# Patient Record
Sex: FEMALE | Race: BLACK OR AFRICAN AMERICAN | Employment: FULL TIME | URBAN - METROPOLITAN AREA
[De-identification: names, ages, dates, MRNs, and addresses within clinical notes are randomized per-mention and may not be internally consistent; named-entity substitution may affect disease eponyms.]

---

## 2018-03-27 ENCOUNTER — DOCUMENTATION ONLY (OUTPATIENT)
Dept: SURGERY | Age: 53
End: 2018-03-27

## 2018-03-27 NOTE — PROGRESS NOTES
Patient confirm appointment. Patient dropped off cd/reports from [facility]. Cd/reports will be at front [email or web address].

## 2018-03-28 ENCOUNTER — DOCUMENTATION ONLY (OUTPATIENT)
Dept: SURGERY | Age: 53
End: 2018-03-28

## 2018-03-28 ENCOUNTER — OFFICE VISIT (OUTPATIENT)
Dept: SURGERY | Age: 53
End: 2018-03-28

## 2018-03-28 VITALS
SYSTOLIC BLOOD PRESSURE: 131 MMHG | HEIGHT: 66 IN | HEART RATE: 90 BPM | DIASTOLIC BLOOD PRESSURE: 66 MMHG | BODY MASS INDEX: 46.61 KG/M2 | WEIGHT: 290 LBS

## 2018-03-28 DIAGNOSIS — N60.01 BREAST CYST, RIGHT: Primary | ICD-10-CM

## 2018-03-28 DIAGNOSIS — D05.12 DUCTAL CARCINOMA IN SITU (DCIS) OF LEFT BREAST: ICD-10-CM

## 2018-03-28 PROBLEM — E66.01 OBESITY, MORBID (HCC): Status: ACTIVE | Noted: 2018-03-28

## 2018-03-28 RX ORDER — ZOLPIDEM TARTRATE 5 MG/1
TABLET ORAL
Refills: 0 | COMMUNITY
Start: 2018-01-17

## 2018-03-28 RX ORDER — RIZATRIPTAN BENZOATE 10 MG/1
TABLET ORAL
Refills: 0 | COMMUNITY
Start: 2018-03-13

## 2018-03-28 NOTE — PROGRESS NOTES
HISTORY OF PRESENT ILLNESS  Francisco Montano is a 47 y.o. female. HPI   NEW patient here today referred for consultation at the request of Dr. Liam Bonner for RIGHT breast lump. Patient palpated lump in RIGHT breast at 7 o'clock one week ago. She has tenderness to RIGHT breast. Denies any nipple discharge/retraction or skin changes. History of breast cancer-  2008- LEFT breast cancer. LEFT mastectomy with bilateral reconstruction. No chemotherapy or radiation. 2/2013- 3ml RIGHT breast cyst aspiration. No FH of breast or ovarian cancer. Recent imaging-  RIGHT screening mammogram at [address] [place] [address] on 11/11/17- BIRADS 2    Review of Systems   Constitutional: Negative. Eyes: Negative. Respiratory: Negative. Cardiovascular: Negative. Gastrointestinal: Positive for heartburn. Genitourinary: Negative. Musculoskeletal: Positive for back pain. Skin: Negative. Neurological: Positive for headaches. Endo/Heme/Allergies: Negative. Psychiatric/Behavioral: Negative.         Physical Exam    ASSESSMENT and PLAN  {ASSESSMENT/PLAN:41434}

## 2018-03-28 NOTE — PATIENT INSTRUCTIONS
Breast Self-Exam: Care Instructions  Your Care Instructions    A breast self-exam is when you check your breasts for lumps or changes. This regular exam helps you learn how your breasts normally look and feel. Most breast problems or changes are not because of cancer. Breast self-exam is not a substitute for a mammogram. Having regular breast exams by your doctor and regular mammograms improve your chances of finding any problems with your breasts. Some women set a time each month to do a step-by-step breast self-exam. Other women like a less formal system. They might look at their breasts as they brush their teeth, or feel their breasts once in a while in the shower. If you notice a change in your breast, tell your doctor. Follow-up care is a key part of your treatment and safety. Be sure to make and go to all appointments, and call your doctor if you are having problems. It's also a good idea to know your test results and keep a list of the medicines you take. How do you do a breast self-exam?  · The best time to examine your breasts is usually one week after your menstrual period begins. Your breasts should not be tender then. If you do not have periods, you might do your exam on a day of the month that is easy to remember. · To examine your breasts:  ¨ Remove all your clothes above the waist and lie down. When you are lying down, your breast tissue spreads evenly over your chest wall, which makes it easier to feel all your breast tissue. ¨ Use the pads-not the fingertips-of the 3 middle fingers of your left hand to check your right breast. Move your fingers slowly in small coin-sized circles that overlap. ¨ Use three levels of pressure to feel of all your breast tissue. Use light pressure to feel the tissue close to the skin surface. Use medium pressure to feel a little deeper. Use firm pressure to feel your tissue close to your breastbone and ribs.  Use each pressure level to feel your breast tissue before moving on to the next spot. ¨ Check your entire breast, moving up and down as if following a strip from the collarbone to the bra line, and from the armpit to the ribs. Repeat until you have covered the entire breast.  ¨ Repeat this procedure for your left breast, using the pads of the 3 middle fingers of your right hand. · To examine your breasts while in the shower:  ¨ Place one arm over your head and lightly soap your breast on that side. ¨ Using the pads of your fingers, gently move your hand over your breast (in the strip pattern described above), feeling carefully for any lumps or changes. ¨ Repeat for the other breast.  · Have your doctor inspect anything you notice to see if you need further testing. Where can you learn more? Go to http://alicia-navneet.info/. Enter P148 in the search box to learn more about \"Breast Self-Exam: Care Instructions. \"  Current as of: May 12, 2017  Content Version: 11.4  © 5093-1628 [employer]wise[employer]. Care instructions adapted under license by [employer] (which disclaims liability or warranty for this information). If you have questions about a medical condition or this instruction, always ask your healthcare professional. [place] [address] any warranty or liability for your use of this information.

## 2018-03-28 NOTE — MR AVS SNAPSHOT
[address] [facility] G11 [address] [place] [address] 
[phone] Patient: Joesph Aschoff MRN: [id number] :1965 Visit Information Date & Time Provider Department Dept. Phone Encounter #  
 3/28/2018  4:10 PM Aminata Aguilar MD [address] at [facility] [phone] Upcoming Health Maintenance Date Due Hepatitis C Screening 1965 DTaP/Tdap/Td series (1 - Tdap) 1985 PAP AKA CERVICAL CYTOLOGY 1985 FOBT Q 1 YEAR AGE 50-75 2014 Influenza Age 5 to Adult 2017 BREAST CANCER SCRN MAMMOGRAM 2019 Allergies as of 3/28/2018  Review Complete On: 3/28/2018 By: Citlalli Castro RN Severity Noted Reaction Type Reactions Aspirin  2018    Unknown (comments) Ibuprofen  2013    Swelling Current Immunizations  Never Reviewed No immunizations on file. Not reviewed this visit Vitals BP Pulse OB Status Smoking Status 131/66 90 Having regular periods Never Smoker Your Updated Medication List  
  
   
This list is accurate as of 3/28/18  3:05 PM.  Always use your most recent med list.  
  
  
  
  
 rizatriptan 10 mg tablet Commonly known as:  MAXALT  
  
 zolpidem 5 mg tablet Commonly known as:  AMBIEN  
  
 ZyrTEC 10 mg Cap Generic drug:  Cetirizine Take  by mouth. Patient Instructions Breast Self-Exam: Care Instructions Your Care Instructions A breast self-exam is when you check your breasts for lumps or changes. This regular exam helps you learn how your breasts normally look and feel. Most breast problems or changes are not because of cancer. Breast self-exam is not a substitute for a mammogram. Having regular breast exams by your doctor and regular mammograms improve your chances of finding any problems with your breasts. Some women set a time each month to do a step-by-step breast self-exam. Other women like a less formal system. They might look at their breasts as they brush their teeth, or feel their breasts once in a while in the shower. If you notice a change in your breast, tell your doctor. Follow-up care is a key part of your treatment and safety. Be sure to make and go to all appointments, and call your doctor if you are having problems. It's also a good idea to know your test results and keep a list of the medicines you take. How do you do a breast self-exam? 
· The best time to examine your breasts is usually one week after your menstrual period begins. Your breasts should not be tender then. If you do not have periods, you might do your exam on a day of the month that is easy to remember. · To examine your breasts: ¨ Remove all your clothes above the waist and lie down. When you are lying down, your breast tissue spreads evenly over your chest wall, which makes it easier to feel all your breast tissue. ¨ Use the pads-not the fingertips-of the 3 middle fingers of your left hand to check your right breast. Move your fingers slowly in small coin-sized circles that overlap. ¨ Use three levels of pressure to feel of all your breast tissue. Use light pressure to feel the tissue close to the skin surface. Use medium pressure to feel a little deeper. Use firm pressure to feel your tissue close to your breastbone and ribs. Use each pressure level to feel your breast tissue before moving on to the next spot. ¨ Check your entire breast, moving up and down as if following a strip from the collarbone to the bra line, and from the armpit to the ribs. Repeat until you have covered the entire breast. 
¨ Repeat this procedure for your left breast, using the pads of the 3 middle fingers of your right hand. · To examine your breasts while in the shower: 
¨ Place one arm over your head and lightly soap your breast on that side. ¨ Using the pads of your fingers, gently move your hand over your breast (in the strip pattern described above), feeling carefully for any lumps or changes. ¨ Repeat for the other breast. 
· Have your doctor inspect anything you notice to see if you need further testing. Where can you learn more? Go to http://alicia-navneet.info/. Enter P148 in the search box to learn more about \"Breast Self-Exam: Care Instructions. \" Current as of: May 12, 2017 Content Version: 11.4 © 6916-5346 [employer]. Care instructions adapted under license by [email or web address] (which disclaims liability or warranty for this information). If you have questions about a medical condition or this instruction, always ask your healthcare professional. [address] any warranty or liability for your use of this information. Introducing [facility]! [employer] introduces [employer] patient portal. Now you can access parts of your medical record, email your doctor's office, and request medication refills online. 1. In your internet browser, go to [email or web address] 2. Click on the First Time User? Click Here link in the Sign In box. You will see the New Member Sign Up page. 3. Enter your FastHealth Access Code exactly as it appears below. You will not need to use this code after youve completed the sign-up process. If you do not sign up before the expiration date, you must request a new code. · Fast[employer] Access Code: [email or web address] Expires: 6/26/2018  2:17 PM 
 
4. Enter the last four digits of your Social Security Number (xxxx) and Date of Birth (mm/dd/yyyy) as indicated and click Submit. You will be taken to the next sign-up page. 5. Create a FastHealth ID. This will be your FastHealth login ID and cannot be changed, so think of one that is secure and easy to remember. 6. Create a iGoOn s.r.l.t password. You can change your password at any time. 7. Enter your Password Reset Question and Answer. This can be used at a later time if you forget your password. 8. Enter your e-mail address. You will receive e-mail notification when new information is available in [address] 9. Click Sign Up. You can now view and download portions of your medical record. 10. Click the Download Summary menu link to download a portable copy of your medical information. If you have questions, please visit the Frequently Asked Questions section of the [employer] website. Remember, [employer] is NOT to be used for urgent needs. For medical emergencies, dial 911. Now available from your iPhone and Android! Please provide this summary of care documentation to your next provider. Your primary care clinician is listed as Nandini Gee. If you have any questions after today's visit, please call [phone].

## 2018-03-28 NOTE — PROGRESS NOTES
Type of Film: [x] CD [] FILMS  Type of Test: [] MRI [x] MAMMO  From: [address] [place] [address]  Given to: given back to patient at end of appointment  To be Downloaded into PACS:  NO

## 2018-03-28 NOTE — COMMUNICATION BODY
HISTORY OF PRESENT ILLNESS  Martell Mcconnell is a 47 y.o. female. HPI   NEW patient here today referred for consultation at the request of Dr. Savannah Trevino for RIGHT breast lump. Patient palpated lump in RIGHT breast at 7 o'clock one week ago. She has tenderness to RIGHT breast. Denies any nipple discharge/retraction or skin changes.      History of breast cancer-  - LEFT breast cancer. LEFT mastectomy with bilateral reconstruction. No chemotherapy or radiation. 2013- 3ml RIGHT breast cyst aspiration.      No FH of breast or ovarian cancer.      Recent imaging-  RIGHT screening mammogram at [facility] on 17- BIRADS 2     Past Medical History:   Diagnosis Date    Breast cancer ([facility] [address]     LEFT breast cancer       Past Surgical History:   Procedure Laterality Date    HX  SECTION      x2    HX HERNIA REPAIR      umbilical    HX MASTECTOMY  2008    LEFT     HX TONSILLECTOMY      HX TUBAL LIGATION         Social History     Social History    Marital status:      Spouse name: N/A    Number of children: N/A    Years of education: N/A     Occupational History    Not on file. Social History Main Topics    Smoking status: Never Smoker    Smokeless tobacco: Never Used    Alcohol use No    Drug use: Not on file    Sexual activity: Not on file     Other Topics Concern    Not on file     Social History Narrative       No current outpatient prescriptions on file prior to visit. No current facility-administered medications on file prior to visit. Allergies   Allergen Reactions    Aspirin Unknown (comments)    Ibuprofen Swelling       OB History     Obstetric Comments    Menarche:  11. LMP: current. # of Children:  2. Age at Delivery of First Child:  23.   Hysterectomy/oophorectomy:  NO/NO. Breast Bx:  Yes, left in . Hx of Breast Feeding:  no. BCP:  no. Hormone therapy:  no.             ROS   Constitutional: Negative. Eyes: Negative. Respiratory: Negative. Cardiovascular: Negative. Gastrointestinal: Positive for heartburn. Genitourinary: Negative. Musculoskeletal: Positive for back pain. Skin: Negative. Neurological: Positive for headaches. Endo/Heme/Allergies: Negative. Psychiatric/Behavioral: Negative. Physical Exam   Cardiovascular: Normal rate and normal heart sounds. Pulmonary/Chest: Breath sounds normal. Right breast exhibits no inverted nipple, no mass, no nipple discharge, no skin change and no tenderness. Left breast exhibits no inverted nipple, no mass, no nipple discharge, no skin change and no tenderness. Breasts are symmetrical.       Lymphadenopathy:        Right cervical: No superficial cervical, no deep cervical and no posterior cervical adenopathy present. Left cervical: No superficial cervical, no deep cervical and no posterior cervical adenopathy present. Right axillary: No pectoral and no lateral adenopathy present. Left axillary: No pectoral and no lateral adenopathy present. BREAST ULTRASOUND   Indication: right breast mass LOQ  Technique: The area was scanned using a high-frequency linear-array near-field transducer  Findings: large mass, oval, hypoechoic, through transmission  Impression: Benign cyst  Disposition: Discussed aspiration or observation. Pt has elected for aspiration      Cyst Aspiration - US Guided  Indication : Symptomatic right breast Cyst.   Findings : We used Ultrasound for Lesion location and guidance for the procedure. Prep : We cleaned the skin with alcohol. Anesthesia : We anesthetized with 1% lidocaine with epinephrine. Aspiration : We advanced an 18 gauge needle into the right breast cyst and aspirated the contents. Yield : This yielded a 23cc of typical cyst fluid. Effect : The lesion disappeared completely. Specimen : We discarded the specimen. Disposition : Followup as scheduled. ASSESSMENT and PLAN    ICD-10-CM ICD-9-CM    1.  Breast cyst, right N60.01 610.0    2. 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 Discussed aspiration given significant pain and pt elected to proceed. 23cc of typical cyst fluid was aspirated. She tolerated the procedure well. F/U PRN. This plan was reviewed with the patient and patient agrees. All questions were answered.     Written by Sophie Jewell, as dictated by Dr. Sameer Lange MD.

## 2018-03-28 NOTE — PROGRESS NOTES
HISTORY OF PRESENT ILLNESS  Mayuri Tam is a 47 y.o. female. HPI   NEW patient here today referred for consultation at the request of Dr. Teo Hawthorne for RIGHT breast lump. Patient palpated lump in RIGHT breast at 7 o'clock one week ago. She has tenderness to RIGHT breast. Denies any nipple discharge/retraction or skin changes.      History of breast cancer-  - LEFT breast cancer. LEFT mastectomy with bilateral reconstruction. No chemotherapy or radiation. 2013- 3ml RIGHT breast cyst aspiration.      No FH of breast or ovarian cancer.      Recent imaging-  RIGHT screening mammogram at [address] [place] [address] on 17- BIRADS 2     Past Medical History:   Diagnosis Date    Breast cancer ([facility] [address]     LEFT breast cancer       Past Surgical History:   Procedure Laterality Date    HX  SECTION      x2    HX HERNIA REPAIR      umbilical    HX MASTECTOMY  2008    LEFT     HX TONSILLECTOMY      HX TUBAL LIGATION         Social History     Social History    Marital status:      Spouse name: N/A    Number of children: N/A    Years of education: N/A     Occupational History    Not on file. Social History Main Topics    Smoking status: Never Smoker    Smokeless tobacco: Never Used    Alcohol use No    Drug use: Not on file    Sexual activity: Not on file     Other Topics Concern    Not on file     Social History Narrative       No current outpatient prescriptions on file prior to visit. No current facility-administered medications on file prior to visit. Allergies   Allergen Reactions    Aspirin Unknown (comments)    Ibuprofen Swelling       OB History     Obstetric Comments    Menarche:  11. LMP: current. # of Children:  2. Age at Delivery of First Child:  23.   Hysterectomy/oophorectomy:  NO/NO. Breast Bx:  Yes, left in . Hx of Breast Feeding:  no. BCP:  no. Hormone therapy:  no.             ROS   Constitutional: Negative. Eyes: Negative. Respiratory: Negative. Cardiovascular: Negative. Gastrointestinal: Positive for heartburn. Genitourinary: Negative. Musculoskeletal: Positive for back pain. Skin: Negative. Neurological: Positive for headaches. Endo/Heme/Allergies: Negative. Psychiatric/Behavioral: Negative. Physical Exam   Cardiovascular: Normal rate and normal heart sounds. Pulmonary/Chest: Breath sounds normal. Right breast exhibits no inverted nipple, no mass, no nipple discharge, no skin change and no tenderness. Left breast exhibits no inverted nipple, no mass, no nipple discharge, no skin change and no tenderness. Breasts are symmetrical.       Lymphadenopathy:        Right cervical: No superficial cervical, no deep cervical and no posterior cervical adenopathy present. Left cervical: No superficial cervical, no deep cervical and no posterior cervical adenopathy present. Right axillary: No pectoral and no lateral adenopathy present. Left axillary: No pectoral and no lateral adenopathy present. BREAST ULTRASOUND   Indication: right breast mass LOQ  Technique: The area was scanned using a high-frequency linear-array near-field transducer  Findings: large mass, oval, hypoechoic, through transmission  Impression: Benign cyst  Disposition: Discussed aspiration or observation. Pt has elected for aspiration      Cyst Aspiration - US Guided  Indication : Symptomatic right breast Cyst.   Findings : We used Ultrasound for Lesion location and guidance for the procedure. Prep : We cleaned the skin with alcohol. Anesthesia : We anesthetized with 1% lidocaine with epinephrine. Aspiration : We advanced an 18 gauge needle into the right breast cyst and aspirated the contents. Yield : This yielded a 23cc of typical cyst fluid. Effect : The lesion disappeared completely. Specimen : We discarded the specimen. Disposition : Followup as scheduled. ASSESSMENT and PLAN    ICD-10-CM ICD-9-CM    1.  Breast cyst, right N60.01 610.0    2. 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 Discussed aspiration given significant pain and pt elected to proceed. 23cc of typical cyst fluid was aspirated. She tolerated the procedure well. F/U PRN. This plan was reviewed with the patient and patient agrees. All questions were answered.     Written by Katherine Morales, as dictated by Dr. Carlota Mccarthy MD.

## 2022-03-19 PROBLEM — E66.01 OBESITY, MORBID (HCC): Status: ACTIVE | Noted: 2018-03-28

## 2022-12-19 ENCOUNTER — OFFICE VISIT (OUTPATIENT)
Dept: SURGERY | Age: 57
End: 2022-12-19
Payer: COMMERCIAL

## 2022-12-19 VITALS
DIASTOLIC BLOOD PRESSURE: 90 MMHG | BODY MASS INDEX: 45 KG/M2 | WEIGHT: 280 LBS | HEART RATE: 69 BPM | HEIGHT: 66 IN | SYSTOLIC BLOOD PRESSURE: 150 MMHG

## 2022-12-19 DIAGNOSIS — T85.44XA CAPSULAR CONTRACTURE OF BREAST IMPLANT, INITIAL ENCOUNTER: ICD-10-CM

## 2022-12-19 DIAGNOSIS — Z85.3 HISTORY OF BREAST CANCER: Primary | ICD-10-CM

## 2022-12-19 PROCEDURE — 99203 OFFICE O/P NEW LOW 30 MIN: CPT | Performed by: SURGERY

## 2022-12-19 RX ORDER — MELOXICAM 15 MG/1
15 TABLET ORAL AS NEEDED
COMMUNITY

## 2022-12-19 NOTE — PROGRESS NOTES
HISTORY OF PRESENT ILLNESS  Lia John is a 61 y.o. female. HPI  NEW patient referral who was last seen in our office in . She has a history of LEFT breast cancer and treated with a mastectomy/reconstruction in . The patient denies any palpable lumps, nipple inversion or discharge in the RIGHT breast.       Mammogram done at [address] [place] [address] 2022 BI RADS 1. History of LEFT mastectomy with reconstruction in . No chemo or radiation was needed. Review of Systems   Constitutional: Negative. HENT: Negative. Eyes: Negative. Respiratory: Negative. Cardiovascular: Negative. Gastrointestinal: Negative. Genitourinary: Negative. Musculoskeletal: Negative. Skin: Negative. Neurological:  Positive for headaches. Endo/Heme/Allergies: Negative. Psychiatric/Behavioral: Negative.            Past Medical History:   Diagnosis Date    Breast cancer ([facility] [address]     LEFT breast cancer       Past Surgical History:   Procedure Laterality Date    HX  SECTION      x2    HX HERNIA REPAIR      umbilical    HX MASTECTOMY  2008    LEFT     HX TONSILLECTOMY      HX TUBAL LIGATION         Social History     Socioeconomic History    Marital status:      Spouse name: Not on file    Number of children: Not on file    Years of education: Not on file    Highest education level: Not on file   Occupational History    Not on file   Tobacco Use    Smoking status: Never    Smokeless tobacco: Never   Substance and Sexual Activity    Alcohol use: No    Drug use: Not on file    Sexual activity: Not on file   Other Topics Concern    Not on file   Social History Narrative    Not on file     Social Determinants of Health     Financial Resource Strain: Not on file   Food Insecurity: Not on file   Transportation Needs: Not on file   Physical Activity: Not on file   Stress: Not on file   Social Connections: Not on file   Intimate Partner Violence: Not on file   Housing Stability: Not on file       Current Outpatient Medications on File Prior to Visit   Medication Sig Dispense Refill    rizatriptan (MAXALT) 10 mg tablet   0    zolpidem (AMBIEN) 5 mg tablet   0    Cetirizine (ZYRTEC) 10 mg cap Take  by mouth. No current facility-administered medications on file prior to visit. Allergies   Allergen Reactions    Aspirin Unknown (comments)    Ibuprofen Swelling       OB History    No obstetric history on file. Obstetric Comments   Menarche:  14. LMP: current. # of Children:  2. Age at Delivery of First Child:  23.   Hysterectomy/oophorectomy:  NO/NO. Breast Bx:  Yes, left in 2008. Hx of Breast Feeding:  no. BCP:  no. Hormone therapy:  no.                  ROS        Physical Exam  Exam conducted with a chaperone present. Constitutional:       Appearance: She is well-developed. She is not diaphoretic. HENT:      Head: Normocephalic and atraumatic. Right Ear: External ear normal.      Left Ear: External ear normal.   Eyes:      General: No scleral icterus. Right eye: No discharge. Left eye: No discharge. Pupils: Pupils are equal, round, and reactive to light. Neck:      Thyroid: No thyromegaly. Vascular: No JVD. Trachea: No tracheal deviation. Cardiovascular:      Rate and Rhythm: Normal rate and regular rhythm. Heart sounds: Normal heart sounds. Pulmonary:      Effort: Pulmonary effort is normal. No tachypnea, accessory muscle usage or respiratory distress. Breath sounds: Normal breath sounds. No stridor. Chest:   Breasts:     Breasts are symmetrical.      Right: No inverted nipple, mass, nipple discharge, skin change or tenderness. Left: No inverted nipple, mass, nipple discharge, skin change or tenderness. Abdominal:      General: There is no distension. Palpations: Abdomen is soft. There is no mass. Tenderness: There is no abdominal tenderness. Musculoskeletal:         General: Normal range of motion.       Cervical back: Normal range of motion and neck supple. Lymphadenopathy:      Cervical: No cervical adenopathy. Skin:     General: Skin is warm and dry. Neurological:      Mental Status: She is alert and oriented to person, place, and time. Psychiatric:         Speech: Speech normal.         Behavior: Behavior normal.         Thought Content: Thought content normal.         Judgment: Judgment normal.         ASSESSMENT and PLAN    ICD-10-CM ICD-9-CM    1. History of breast cancer  Z85.3 V10.3       2. Capsular contracture of breast implant, initial encounter  T85.44XA 611.83         New patient presents discuss surgery and is doing well overall. Upon examination noted breast implant of LEFT breast with mild encapsulation, normal breast exam of RIGHT breast. Patient states wanting BILATERAL breast reconstruction with either KELLI flap or implants. Explained to patient of the procedures and that she will need a mammogram before procedure. Patient stated she has an appointment on January 4th to meet with the plastic surgeon to discuss the options. Follow up after plastic surgeon visit. This plan was reviewed with the patient and patient agrees. All questions were answered. Total time spent was 40 minutes.       Written by Carla Lara, as dictated by Dr. Trini Mclean MD.

## 2022-12-19 NOTE — PROGRESS NOTES
HISTORY OF PRESENT ILLNESS  Ricardo Dyer is a 61 y.o. female. HPI NEW patient referral who was last seen in our office in 2013. She has a history of LEFT breast cancer and treated with a mastectomy/reconstruction in 2008. The patient denies any palpable lumps, nipple inversion or discharge in the RIGHT breast.   Mammogram done at [address] [place] [address] 6/2022 BI RADS 1. History of LEFT mastectomy with reconstruction in 2008. No chemo or radiation was needed. Review of Systems   Constitutional: Negative. HENT: Negative. Eyes: Negative. Respiratory: Negative. Cardiovascular: Negative. Gastrointestinal: Negative. Genitourinary: Negative. Musculoskeletal: Negative. Skin: Negative. Neurological:  Positive for headaches. Endo/Heme/Allergies: Negative. Psychiatric/Behavioral: Negative.        Physical Exam    ASSESSMENT and PLAN  {ASSESSMENT/PLAN:08679}

## 2023-01-05 ENCOUNTER — TRANSCRIBE ORDER (OUTPATIENT)
Dept: SCHEDULING | Age: 58
End: 2023-01-05

## 2023-01-05 DIAGNOSIS — C50.912 MALIGNANT NEOPLASM OF LEFT BREAST (HCC): Primary | ICD-10-CM

## 2023-01-12 ENCOUNTER — HOSPITAL ENCOUNTER (OUTPATIENT)
Dept: CT IMAGING | Age: 58
Discharge: HOME | End: 2023-01-12
Attending: PLASTIC SURGERY
Payer: COMMERCIAL

## 2023-01-12 DIAGNOSIS — C50.912 MALIGNANT NEOPLASM OF LEFT BREAST (HCC): ICD-10-CM

## 2023-01-12 PROCEDURE — 74011000636 HC RX REV CODE- 636: Performed by: PLASTIC SURGERY

## 2023-01-12 PROCEDURE — 74174 CTA ABD&PLVS W/CONTRAST: CPT

## 2023-01-12 RX ADMIN — IOPAMIDOL 100 ML: 755 INJECTION, SOLUTION INTRAVENOUS at 15:39

## 2023-03-14 DIAGNOSIS — T85.44XA CAPSULAR CONTRACTURE OF BREAST IMPLANT, INITIAL ENCOUNTER: ICD-10-CM

## 2023-03-14 DIAGNOSIS — Z85.3 PERSONAL HISTORY OF MALIGNANT NEOPLASM OF BREAST: Primary | ICD-10-CM

## 2023-03-17 ENCOUNTER — HOSPITAL ENCOUNTER (OUTPATIENT)
Dept: GENERAL RADIOLOGY | Age: 58
End: 2023-03-17
Attending: PLASTIC SURGERY
Payer: COMMERCIAL

## 2023-03-17 ENCOUNTER — HOSPITAL ENCOUNTER (OUTPATIENT)
Dept: PREADMISSION TESTING | Age: 58
Discharge: HOME | End: 2023-03-17
Payer: COMMERCIAL

## 2023-03-17 VITALS
TEMPERATURE: 97.7 F | DIASTOLIC BLOOD PRESSURE: 96 MMHG | WEIGHT: 279.3 LBS | HEART RATE: 66 BPM | OXYGEN SATURATION: 98 % | BODY MASS INDEX: 43.84 KG/M2 | SYSTOLIC BLOOD PRESSURE: 141 MMHG | HEIGHT: 67 IN | RESPIRATION RATE: 12 BRPM

## 2023-03-17 LAB
ABO + RH BLD: NORMAL
BASOPHILS # BLD: 0 K/UL (ref 0–0.1)
BASOPHILS NFR BLD: 1 % (ref 0–1)
BLOOD GROUP ANTIBODIES SERPL: NORMAL
DIFFERENTIAL METHOD BLD: ABNORMAL
EOSINOPHIL # BLD: 0.1 K/UL (ref 0–0.4)
EOSINOPHIL NFR BLD: 3 % (ref 0–7)
ERYTHROCYTE [DISTWIDTH] IN BLOOD BY AUTOMATED COUNT: 14.1 % (ref 11.5–14.5)
HCT VFR BLD AUTO: 40.5 % (ref 35–47)
HGB BLD-MCNC: 12.4 G/DL (ref 11.5–16)
IMM GRANULOCYTES # BLD AUTO: 0 K/UL (ref 0–0.04)
IMM GRANULOCYTES NFR BLD AUTO: 0 % (ref 0–0.5)
LYMPHOCYTES # BLD: 2 K/UL (ref 0.8–3.5)
LYMPHOCYTES NFR BLD: 49 % (ref 12–49)
MCH RBC QN AUTO: 27 PG (ref 26–34)
MCHC RBC AUTO-ENTMCNC: 30.6 G/DL (ref 30–36.5)
MCV RBC AUTO: 88 FL (ref 80–99)
MONOCYTES # BLD: 0.3 K/UL (ref 0–1)
MONOCYTES NFR BLD: 8 % (ref 5–13)
NEUTS SEG # BLD: 1.6 K/UL (ref 1.8–8)
NEUTS SEG NFR BLD: 39 % (ref 32–75)
NRBC # BLD: 0 K/UL (ref 0–0.01)
NRBC BLD-RTO: 0 PER 100 WBC
PLATELET # BLD AUTO: 289 K/UL (ref 150–400)
PMV BLD AUTO: 9.9 FL (ref 8.9–12.9)
RBC # BLD AUTO: 4.6 M/UL (ref 3.8–5.2)
SPECIMEN EXP DATE BLD: NORMAL
WBC # BLD AUTO: 4 K/UL (ref 3.6–11)

## 2023-03-17 PROCEDURE — 93005 ELECTROCARDIOGRAM TRACING: CPT

## 2023-03-17 PROCEDURE — 71046 X-RAY EXAM CHEST 2 VIEWS: CPT

## 2023-03-17 PROCEDURE — 86900 BLOOD TYPING SEROLOGIC ABO: CPT

## 2023-03-17 PROCEDURE — 36415 COLL VENOUS BLD VENIPUNCTURE: CPT

## 2023-03-17 PROCEDURE — 85025 COMPLETE CBC W/AUTO DIFF WBC: CPT

## 2023-03-17 NOTE — PERIOP NOTES
[address] [address] [facility]   MAIN OR                                  [phone]   MAIN PRE OP                          [phone]                                                                                AMBULATORY PRE OP          [phone]  PRE-ADMISSION TESTING    [phone]     Surgery Date:  March 28, 2023       Is surgery arrival time given by surgeon? NO  If NO, [facility] staff will call you between 3 and 7pm the day before your surgery with your arrival time. (If your surgery is on a Monday, we will call you the Friday before.)    Call [phone] after 7pm Monday-Friday if you did not receive this call. INSTRUCTIONS BEFORE YOUR SURGERY   When You  Arrive Arrive at the 2nd [address] [facility] on the day of your surgery  Have your insurance card, photo ID, and any copayment (if needed)   Food   and   Drink NO food or drink after midnight the night before surgery    This means NO water, gum, mints, coffee, juice, etc.  No alcohol (beer, wine, liquor) 24 hours before and after surgery   Medications to   TAKE   Morning of Surgery MEDICATIONS TO TAKE THE MORNING OF SURGERY WITH A SIP OF WATER:    Tylenol if needed   Medications  To  STOP      7 days before surgery Non-Steroidal anti-inflammatory Drugs (NSAID's): for example, Ibuprofen (Advil, Motrin), Naproxen (Aleve), Meloxicam  Aspirin, if taking for pain   Herbal supplements, vitamins, and fish oil  Other:  (Pain medications not listed above, including Tylenol may be taken)   Blood  Thinners If you take  Aspirin, Plavix, Coumadin, or any blood-thinning or anti-blood clot medicine, talk to the doctor who prescribed the medications for pre-operative instructions.    Bathing Clothing  Jewelry  Valuables     If you shower the morning of surgery, please do not apply anything to your skin (lotions, powders, deodorant, or makeup, especially mascara)  Follow [employer] body wash instructions provided to you during PAT appointment. Begin 3 days prior to surgery. Do not shave or trim anywhere 24 hours before surgery  Wear your hair loose or down; no pony-tails, buns, or metal hair clips  Wear loose, comfortable, clean clothes  Wear glasses instead of contacts  Leave money, valuables, and jewelry, including body piercings, at home  If you were given an [employer], bring it on day of surgery. Going Home - or Spending the Night SAME-DAY SURGERY: You must have a responsible adult drive you home and stay with you 24 hours after surgery  ADMITS: If your doctor is keeping you in the hospital after surgery, leave personal belongings/luggage in your car until you have a hospital room number. Hospital discharge time is 12 noon  Drivers must be here before 12 noon unless you are told differently   Special Instructions Your BP was elevated to day, 141/96 with repeat at end of appointment of 162/80. Please see your PCP prior to surgery, all us at [phone] once you have that date and time for appointment. Follow all instructions so your surgery wont be cancelled. Please, be on time. If a situation occurs and you are delayed the day of surgery, call [phone] or [phone]. If your physical condition changes (like a fever, cold, flu, etc.) call your surgeon. Home medication(s) reviewed and verified verbally with list during PAT appointment. The patient was contacted in person. The patient verbalizes understanding of all instructions and does not need reinforcement.

## 2023-03-19 LAB
ATRIAL RATE: 76 BPM
CALCULATED P AXIS, ECG09: 63 DEGREES
CALCULATED R AXIS, ECG10: 63 DEGREES
CALCULATED T AXIS, ECG11: 68 DEGREES
DIAGNOSIS, 93000: NORMAL
P-R INTERVAL, ECG05: 200 MS
Q-T INTERVAL, ECG07: 390 MS
QRS DURATION, ECG06: 108 MS
QTC CALCULATION (BEZET), ECG08: 438 MS
VENTRICULAR RATE, ECG03: 76 BPM

## 2023-03-27 ENCOUNTER — ANESTHESIA EVENT (OUTPATIENT)
Dept: SURGERY | Age: 58
End: 2023-03-27
Payer: COMMERCIAL

## 2023-03-27 NOTE — PERIOP NOTES
Message left with PCP office requesting follow up visit note from 3/23/23. Called patient to follow up regarding elevated blood pressure. Patient states she was started on Amlodipine 2.5mg daily. Patient instructed to take Amlodipine today and tomorrow morning. Patient verbalized understanding. Amlodipine added to patient's med rec.

## 2023-03-28 ENCOUNTER — ANESTHESIA (OUTPATIENT)
Dept: SURGERY | Age: 58
End: 2023-03-28
Payer: COMMERCIAL

## 2023-03-28 ENCOUNTER — HOSPITAL ENCOUNTER (INPATIENT)
Age: 58
LOS: 3 days | Discharge: HOME | DRG: 582 | End: 2023-03-31
Attending: PLASTIC SURGERY | Admitting: PLASTIC SURGERY
Payer: COMMERCIAL

## 2023-03-28 DIAGNOSIS — T85.44XA CAPSULAR CONTRACTURE OF BREAST IMPLANT, INITIAL ENCOUNTER: ICD-10-CM

## 2023-03-28 DIAGNOSIS — I49.3 PVC'S (PREMATURE VENTRICULAR CONTRACTIONS): Primary | ICD-10-CM

## 2023-03-28 DIAGNOSIS — Z85.3 PERSONAL HISTORY OF MALIGNANT NEOPLASM OF BREAST: ICD-10-CM

## 2023-03-28 LAB
ANION GAP SERPL CALC-SCNC: 7 MMOL/L (ref 5–15)
BASOPHILS # BLD: 0 K/UL (ref 0–0.1)
BASOPHILS NFR BLD: 0 % (ref 0–1)
BUN SERPL-MCNC: 10 MG/DL (ref 6–20)
BUN/CREAT SERPL: 8 (ref 12–20)
CALCIUM SERPL-MCNC: 8.5 MG/DL (ref 8.5–10.1)
CHLORIDE SERPL-SCNC: 108 MMOL/L (ref 97–108)
CO2 SERPL-SCNC: 24 MMOL/L (ref 21–32)
CREAT SERPL-MCNC: 1.25 MG/DL (ref 0.55–1.02)
DIFFERENTIAL METHOD BLD: ABNORMAL
EOSINOPHIL # BLD: 0 K/UL (ref 0–0.4)
EOSINOPHIL NFR BLD: 0 % (ref 0–7)
ERYTHROCYTE [DISTWIDTH] IN BLOOD BY AUTOMATED COUNT: 13.9 % (ref 11.5–14.5)
GLUCOSE SERPL-MCNC: 148 MG/DL (ref 65–100)
HCT VFR BLD AUTO: 37.7 % (ref 35–47)
HGB BLD-MCNC: 11.8 G/DL (ref 11.5–16)
IMM GRANULOCYTES # BLD AUTO: 0 K/UL (ref 0–0.04)
IMM GRANULOCYTES NFR BLD AUTO: 0 % (ref 0–0.5)
LYMPHOCYTES # BLD: 1.1 K/UL (ref 0.8–3.5)
LYMPHOCYTES NFR BLD: 10 % (ref 12–49)
MCH RBC QN AUTO: 27.3 PG (ref 26–34)
MCHC RBC AUTO-ENTMCNC: 31.3 G/DL (ref 30–36.5)
MCV RBC AUTO: 87.3 FL (ref 80–99)
MONOCYTES # BLD: 1 K/UL (ref 0–1)
MONOCYTES NFR BLD: 9 % (ref 5–13)
NEUTS SEG # BLD: 9.2 K/UL (ref 1.8–8)
NEUTS SEG NFR BLD: 81 % (ref 32–75)
NRBC # BLD: 0 K/UL (ref 0–0.01)
NRBC BLD-RTO: 0 PER 100 WBC
PLATELET # BLD AUTO: 281 K/UL (ref 150–400)
PMV BLD AUTO: 9.6 FL (ref 8.9–12.9)
POTASSIUM SERPL-SCNC: 4.5 MMOL/L (ref 3.5–5.1)
RBC # BLD AUTO: 4.32 M/UL (ref 3.8–5.2)
SODIUM SERPL-SCNC: 139 MMOL/L (ref 136–145)
WBC # BLD AUTO: 11.3 K/UL (ref 3.6–11)

## 2023-03-28 PROCEDURE — 77030008463 HC STPLR SKN PROX J&J -B: Performed by: PLASTIC SURGERY

## 2023-03-28 PROCEDURE — 74011000250 HC RX REV CODE- 250: Performed by: PLASTIC SURGERY

## 2023-03-28 PROCEDURE — 77030014336 HC CLP LIG TI SYNO -B: Performed by: PLASTIC SURGERY

## 2023-03-28 PROCEDURE — 74011000250 HC RX REV CODE- 250: Performed by: NURSE ANESTHETIST, CERTIFIED REGISTERED

## 2023-03-28 PROCEDURE — 77030002916 HC SUT ETHLN J&J -A: Performed by: PLASTIC SURGERY

## 2023-03-28 PROCEDURE — 0HB5XZZ EXCISION OF CHEST SKIN, EXTERNAL APPROACH: ICD-10-PCS | Performed by: SURGERY

## 2023-03-28 PROCEDURE — 0HPU0NZ REMOVAL OF TISSUE EXPANDER FROM LEFT BREAST, OPEN APPROACH: ICD-10-PCS | Performed by: SURGERY

## 2023-03-28 PROCEDURE — 77030002933 HC SUT MCRYL J&J -A: Performed by: PLASTIC SURGERY

## 2023-03-28 PROCEDURE — 76010000170 HC OR TIME 13.5 TO 14 HR INTENSV-TIER 1: Performed by: PLASTIC SURGERY

## 2023-03-28 PROCEDURE — 74011250637 HC RX REV CODE- 250/637: Performed by: PLASTIC SURGERY

## 2023-03-28 PROCEDURE — 74011250636 HC RX REV CODE- 250/636: Performed by: PLASTIC SURGERY

## 2023-03-28 PROCEDURE — 77030029194 HC CPLR ANAST MICVAS DEV SYNO -D: Performed by: PLASTIC SURGERY

## 2023-03-28 PROCEDURE — 77030013474 HC CRD BPLR DISP ADLR -A: Performed by: PLASTIC SURGERY

## 2023-03-28 PROCEDURE — 77030002996 HC SUT SLK J&J -A: Performed by: PLASTIC SURGERY

## 2023-03-28 PROCEDURE — 77030040504 HC DRN WND MDII -B: Performed by: PLASTIC SURGERY

## 2023-03-28 PROCEDURE — 77030040361 HC SLV COMPR DVT MDII -B

## 2023-03-28 PROCEDURE — 74011000258 HC RX REV CODE- 258: Performed by: PLASTIC SURGERY

## 2023-03-28 PROCEDURE — 77030027413 HC ADH SKN AESC -B: Performed by: PLASTIC SURGERY

## 2023-03-28 PROCEDURE — 88342 IMHCHEM/IMCYTCHM 1ST ANTB: CPT

## 2023-03-28 PROCEDURE — 80048 BASIC METABOLIC PNL TOTAL CA: CPT

## 2023-03-28 PROCEDURE — 77030013705 HC HK ELAS STAY COOP -B: Performed by: PLASTIC SURGERY

## 2023-03-28 PROCEDURE — 77030040506 HC DRN WND MDII -A: Performed by: PLASTIC SURGERY

## 2023-03-28 PROCEDURE — 0HTT0ZZ RESECTION OF RIGHT BREAST, OPEN APPROACH: ICD-10-PCS | Performed by: SURGERY

## 2023-03-28 PROCEDURE — 77030040140 HC SENSOR STO2 OXMTR TISS VIOT -G1: Performed by: PLASTIC SURGERY

## 2023-03-28 PROCEDURE — 88307 TISSUE EXAM BY PATHOLOGIST: CPT

## 2023-03-28 PROCEDURE — 77030010512 HC APPL CLP LIG J&J -C: Performed by: PLASTIC SURGERY

## 2023-03-28 PROCEDURE — 77030008462 HC STPLR SKN PROX J&J -A: Performed by: PLASTIC SURGERY

## 2023-03-28 PROCEDURE — 77030019940 HC BLNKT HYPOTHRM STRY -B: Performed by: PLASTIC SURGERY

## 2023-03-28 PROCEDURE — 65610000006 HC RM INTENSIVE CARE

## 2023-03-28 PROCEDURE — 77030025876 HC CLMP VSL SGL DISP SYNO -D: Performed by: PLASTIC SURGERY

## 2023-03-28 PROCEDURE — 74011250636 HC RX REV CODE- 250/636: Performed by: NURSE ANESTHETIST, CERTIFIED REGISTERED

## 2023-03-28 PROCEDURE — 77030035236 HC SUT PDS STRATFX BARB J&J -B: Performed by: PLASTIC SURGERY

## 2023-03-28 PROCEDURE — 77030030275 HC CLMP VSL DBL DISP SYNO -F: Performed by: PLASTIC SURGERY

## 2023-03-28 PROCEDURE — 76210000016 HC OR PH I REC 1 TO 1.5 HR: Performed by: PLASTIC SURGERY

## 2023-03-28 PROCEDURE — 85025 COMPLETE CBC W/AUTO DIFF WBC: CPT

## 2023-03-28 PROCEDURE — 77030029262 HC BKGRN VISBLTY MAT SYNO -B: Performed by: PLASTIC SURGERY

## 2023-03-28 PROCEDURE — 76060000058: Performed by: PLASTIC SURGERY

## 2023-03-28 PROCEDURE — P9045 ALBUMIN (HUMAN), 5%, 250 ML: HCPCS | Performed by: NURSE ANESTHETIST, CERTIFIED REGISTERED

## 2023-03-28 PROCEDURE — 88305 TISSUE EXAM BY PATHOLOGIST: CPT

## 2023-03-28 PROCEDURE — 77030040922 HC BLNKT HYPOTHRM STRY -A

## 2023-03-28 PROCEDURE — 77030026438 HC STYL ET INTUB CARD -A: Performed by: ANESTHESIOLOGY

## 2023-03-28 PROCEDURE — 77030025874 HC CLP HMSTAT MIC SUPFN TI SYNO -B: Performed by: PLASTIC SURGERY

## 2023-03-28 PROCEDURE — 77030041680 HC PNCL ELECSURG SMK EVAC CNMD -B: Performed by: PLASTIC SURGERY

## 2023-03-28 PROCEDURE — 19303 MAST SIMPLE COMPLETE: CPT | Performed by: SURGERY

## 2023-03-28 PROCEDURE — 36415 COLL VENOUS BLD VENIPUNCTURE: CPT

## 2023-03-28 PROCEDURE — 74011636637 HC RX REV CODE- 636/637: Performed by: PLASTIC SURGERY

## 2023-03-28 PROCEDURE — 3E0T3BZ INTRODUCTION OF ANESTHETIC AGENT INTO PERIPHERAL NERVES AND PLEXI, PERCUTANEOUS APPROACH: ICD-10-PCS | Performed by: SURGERY

## 2023-03-28 PROCEDURE — 74011250636 HC RX REV CODE- 250/636: Performed by: ANESTHESIOLOGY

## 2023-03-28 PROCEDURE — 0HRV077 REPLACEMENT OF BILATERAL BREAST USING DEEP INFERIOR EPIGASTRIC ARTERY PERFORATOR FLAP, OPEN APPROACH: ICD-10-PCS | Performed by: SURGERY

## 2023-03-28 PROCEDURE — 2709999900 HC NON-CHARGEABLE SUPPLY: Performed by: PLASTIC SURGERY

## 2023-03-28 PROCEDURE — 77030031139 HC SUT VCRL2 J&J -A: Performed by: PLASTIC SURGERY

## 2023-03-28 PROCEDURE — 77030003028 HC SUT VCRL J&J -A: Performed by: PLASTIC SURGERY

## 2023-03-28 PROCEDURE — 77030002917 HC SUT ETHLN J&J -B: Performed by: PLASTIC SURGERY

## 2023-03-28 PROCEDURE — 77030005513 HC CATH URETH FOL11 MDII -B: Performed by: PLASTIC SURGERY

## 2023-03-28 PROCEDURE — C9290 INJ, BUPIVACAINE LIPOSOME: HCPCS | Performed by: PLASTIC SURGERY

## 2023-03-28 PROCEDURE — 77030016441 HC APPL CLP LIG1 J&J -B: Performed by: PLASTIC SURGERY

## 2023-03-28 PROCEDURE — 93005 ELECTROCARDIOGRAM TRACING: CPT

## 2023-03-28 PROCEDURE — 77030011267 HC ELECTRD BLD COVD -A: Performed by: PLASTIC SURGERY

## 2023-03-28 DEVICE — THE GEM MICROVASCULAR ANASTOMOTIC COUPLER DEVICE RINGS ARE MADE OF POLYETHYLENE AND SURGICAL GRADE STAINLESS STEEL PINS. A PROTECTIVE COVER AND JAW ASSEMBLY PROTECT THE RINGS AND ALLOW FOR EASY LOADING ONTO THE ANASTOMOTIC INSTRUMENT. BOTH THE PROTECTIVE COVER AND JAW ASSEMBLY ARE DISPOSABLE. THE GEM MICROVASCULAR ANASTOMOTIC COUPLER DEVICE IS SINGLE-USE AND AVAILABLE IN VARIOUS SIZES
Type: IMPLANTABLE DEVICE | Site: BREAST | Status: FUNCTIONAL
Brand: GEM MICROVASCULAR ANASTOMOTIC COUPLER

## 2023-03-28 DEVICE — IMPLANTABLE DEVICE: Type: IMPLANTABLE DEVICE | Status: FUNCTIONAL

## 2023-03-28 RX ORDER — DIPHENHYDRAMINE HYDROCHLORIDE 50 MG/ML
12.5 INJECTION, SOLUTION INTRAMUSCULAR; INTRAVENOUS AS NEEDED
Status: DISCONTINUED | OUTPATIENT
Start: 2023-03-28 | End: 2023-03-28 | Stop reason: HOSPADM

## 2023-03-28 RX ORDER — SODIUM CHLORIDE, SODIUM LACTATE, POTASSIUM CHLORIDE, CALCIUM CHLORIDE 600; 310; 30; 20 MG/100ML; MG/100ML; MG/100ML; MG/100ML
125 INJECTION, SOLUTION INTRAVENOUS CONTINUOUS
Status: DISCONTINUED | OUTPATIENT
Start: 2023-03-28 | End: 2023-03-28 | Stop reason: HOSPADM

## 2023-03-28 RX ORDER — HYDROMORPHONE HYDROCHLORIDE 2 MG/1
2 TABLET ORAL
Status: DISCONTINUED | OUTPATIENT
Start: 2023-03-28 | End: 2023-03-31 | Stop reason: HOSPADM

## 2023-03-28 RX ORDER — GABAPENTIN 300 MG/1
300 CAPSULE ORAL 3 TIMES DAILY
Status: DISCONTINUED | OUTPATIENT
Start: 2023-03-28 | End: 2023-03-31 | Stop reason: HOSPADM

## 2023-03-28 RX ORDER — GLYCOPYRROLATE 0.2 MG/ML
INJECTION INTRAMUSCULAR; INTRAVENOUS AS NEEDED
Status: DISCONTINUED | OUTPATIENT
Start: 2023-03-28 | End: 2023-03-28 | Stop reason: HOSPADM

## 2023-03-28 RX ORDER — FAMOTIDINE 10 MG/ML
INJECTION INTRAVENOUS AS NEEDED
Status: DISCONTINUED | OUTPATIENT
Start: 2023-03-28 | End: 2023-03-28 | Stop reason: HOSPADM

## 2023-03-28 RX ORDER — PROPOFOL 10 MG/ML
INJECTION, EMULSION INTRAVENOUS AS NEEDED
Status: DISCONTINUED | OUTPATIENT
Start: 2023-03-28 | End: 2023-03-28 | Stop reason: HOSPADM

## 2023-03-28 RX ORDER — FENTANYL CITRATE 50 UG/ML
INJECTION, SOLUTION INTRAMUSCULAR; INTRAVENOUS AS NEEDED
Status: DISCONTINUED | OUTPATIENT
Start: 2023-03-28 | End: 2023-03-28 | Stop reason: HOSPADM

## 2023-03-28 RX ORDER — DEXAMETHASONE SODIUM PHOSPHATE 4 MG/ML
INJECTION, SOLUTION INTRA-ARTICULAR; INTRALESIONAL; INTRAMUSCULAR; INTRAVENOUS; SOFT TISSUE AS NEEDED
Status: DISCONTINUED | OUTPATIENT
Start: 2023-03-28 | End: 2023-03-28 | Stop reason: HOSPADM

## 2023-03-28 RX ORDER — ALBUMIN HUMAN 50 G/1000ML
SOLUTION INTRAVENOUS AS NEEDED
Status: DISCONTINUED | OUTPATIENT
Start: 2023-03-28 | End: 2023-03-28 | Stop reason: HOSPADM

## 2023-03-28 RX ORDER — DOCUSATE SODIUM 100 MG/1
100 CAPSULE, LIQUID FILLED ORAL 2 TIMES DAILY
Status: DISCONTINUED | OUTPATIENT
Start: 2023-03-28 | End: 2023-03-31 | Stop reason: HOSPADM

## 2023-03-28 RX ORDER — HYDROMORPHONE HYDROCHLORIDE 2 MG/1
4 TABLET ORAL
Status: DISCONTINUED | OUTPATIENT
Start: 2023-03-28 | End: 2023-03-31 | Stop reason: HOSPADM

## 2023-03-28 RX ORDER — LIDOCAINE HYDROCHLORIDE AND EPINEPHRINE 10; 10 MG/ML; UG/ML
INJECTION, SOLUTION INFILTRATION; PERINEURAL AS NEEDED
Status: DISCONTINUED | OUTPATIENT
Start: 2023-03-28 | End: 2023-03-28 | Stop reason: HOSPADM

## 2023-03-28 RX ORDER — EPHEDRINE SULFATE/0.9% NACL/PF 50 MG/5 ML
SYRINGE (ML) INTRAVENOUS AS NEEDED
Status: DISCONTINUED | OUTPATIENT
Start: 2023-03-28 | End: 2023-03-28 | Stop reason: HOSPADM

## 2023-03-28 RX ORDER — VECURONIUM BROMIDE FOR INJECTION 1 MG/ML
INJECTION, POWDER, LYOPHILIZED, FOR SOLUTION INTRAVENOUS AS NEEDED
Status: DISCONTINUED | OUTPATIENT
Start: 2023-03-28 | End: 2023-03-28 | Stop reason: HOSPADM

## 2023-03-28 RX ORDER — FLUMAZENIL 0.1 MG/ML
0.2 INJECTION INTRAVENOUS
Status: DISCONTINUED | OUTPATIENT
Start: 2023-03-28 | End: 2023-03-28 | Stop reason: HOSPADM

## 2023-03-28 RX ORDER — LIDOCAINE HYDROCHLORIDE 20 MG/ML
INJECTION, SOLUTION EPIDURAL; INFILTRATION; INTRACAUDAL; PERINEURAL AS NEEDED
Status: DISCONTINUED | OUTPATIENT
Start: 2023-03-28 | End: 2023-03-28 | Stop reason: HOSPADM

## 2023-03-28 RX ORDER — HYDROMORPHONE HYDROCHLORIDE 1 MG/ML
.25-1 INJECTION, SOLUTION INTRAMUSCULAR; INTRAVENOUS; SUBCUTANEOUS
Status: DISCONTINUED | OUTPATIENT
Start: 2023-03-28 | End: 2023-03-28 | Stop reason: HOSPADM

## 2023-03-28 RX ORDER — PAPAVERINE HYDROCHLORIDE 30 MG/ML
INJECTION INTRAMUSCULAR; INTRAVENOUS AS NEEDED
Status: DISCONTINUED | OUTPATIENT
Start: 2023-03-28 | End: 2023-03-28 | Stop reason: HOSPADM

## 2023-03-28 RX ORDER — MORPHINE SULFATE 1 MG/ML
2 INJECTION, SOLUTION EPIDURAL; INTRATHECAL; INTRAVENOUS
Status: DISCONTINUED | OUTPATIENT
Start: 2023-03-28 | End: 2023-03-29

## 2023-03-28 RX ORDER — ACETAMINOPHEN 500 MG
1000 TABLET ORAL EVERY 6 HOURS
Status: COMPLETED | OUTPATIENT
Start: 2023-03-28 | End: 2023-03-30

## 2023-03-28 RX ORDER — SUCCINYLCHOLINE CHLORIDE 20 MG/ML
INJECTION INTRAMUSCULAR; INTRAVENOUS AS NEEDED
Status: DISCONTINUED | OUTPATIENT
Start: 2023-03-28 | End: 2023-03-28 | Stop reason: HOSPADM

## 2023-03-28 RX ORDER — HEPARIN SODIUM 5000 [USP'U]/ML
5000 INJECTION, SOLUTION INTRAVENOUS; SUBCUTANEOUS EVERY 12 HOURS
Status: DISCONTINUED | OUTPATIENT
Start: 2023-03-29 | End: 2023-03-31 | Stop reason: HOSPADM

## 2023-03-28 RX ORDER — IBUPROFEN 600 MG/1
600 TABLET ORAL EVERY 6 HOURS
Status: DISCONTINUED | OUTPATIENT
Start: 2023-03-29 | End: 2023-03-28

## 2023-03-28 RX ORDER — LIDOCAINE HYDROCHLORIDE 10 MG/ML
0.1 INJECTION, SOLUTION EPIDURAL; INFILTRATION; INTRACAUDAL; PERINEURAL AS NEEDED
Status: DISCONTINUED | OUTPATIENT
Start: 2023-03-28 | End: 2023-03-28 | Stop reason: HOSPADM

## 2023-03-28 RX ORDER — NALOXONE HYDROCHLORIDE 0.4 MG/ML
0.2 INJECTION, SOLUTION INTRAMUSCULAR; INTRAVENOUS; SUBCUTANEOUS
Status: DISCONTINUED | OUTPATIENT
Start: 2023-03-28 | End: 2023-03-28 | Stop reason: HOSPADM

## 2023-03-28 RX ORDER — ONDANSETRON 2 MG/ML
8 INJECTION INTRAMUSCULAR; INTRAVENOUS
Status: DISCONTINUED | OUTPATIENT
Start: 2023-03-28 | End: 2023-03-31 | Stop reason: HOSPADM

## 2023-03-28 RX ORDER — HYDROMORPHONE HYDROCHLORIDE 2 MG/ML
INJECTION, SOLUTION INTRAMUSCULAR; INTRAVENOUS; SUBCUTANEOUS AS NEEDED
Status: DISCONTINUED | OUTPATIENT
Start: 2023-03-28 | End: 2023-03-28 | Stop reason: HOSPADM

## 2023-03-28 RX ORDER — SODIUM CHLORIDE, SODIUM LACTATE, POTASSIUM CHLORIDE, CALCIUM CHLORIDE 600; 310; 30; 20 MG/100ML; MG/100ML; MG/100ML; MG/100ML
INJECTION, SOLUTION INTRAVENOUS
Status: DISCONTINUED | OUTPATIENT
Start: 2023-03-28 | End: 2023-03-28 | Stop reason: HOSPADM

## 2023-03-28 RX ORDER — HEPARIN SODIUM 10000 [USP'U]/ML
INJECTION, SOLUTION INTRAVENOUS; SUBCUTANEOUS AS NEEDED
Status: DISCONTINUED | OUTPATIENT
Start: 2023-03-28 | End: 2023-03-28 | Stop reason: HOSPADM

## 2023-03-28 RX ORDER — ONDANSETRON 2 MG/ML
INJECTION INTRAMUSCULAR; INTRAVENOUS AS NEEDED
Status: DISCONTINUED | OUTPATIENT
Start: 2023-03-28 | End: 2023-03-28 | Stop reason: HOSPADM

## 2023-03-28 RX ORDER — VECURONIUM BROMIDE FOR INJECTION 1 MG/ML
INJECTION, POWDER, LYOPHILIZED, FOR SOLUTION INTRAVENOUS AS NEEDED
Status: DISCONTINUED | OUTPATIENT
Start: 2023-03-28 | End: 2023-03-28

## 2023-03-28 RX ORDER — SODIUM CHLORIDE, SODIUM LACTATE, POTASSIUM CHLORIDE, CALCIUM CHLORIDE 600; 310; 30; 20 MG/100ML; MG/100ML; MG/100ML; MG/100ML
100 INJECTION, SOLUTION INTRAVENOUS CONTINUOUS
Status: DISCONTINUED | OUTPATIENT
Start: 2023-03-28 | End: 2023-03-29

## 2023-03-28 RX ORDER — MIDAZOLAM HYDROCHLORIDE 1 MG/ML
INJECTION, SOLUTION INTRAMUSCULAR; INTRAVENOUS AS NEEDED
Status: DISCONTINUED | OUTPATIENT
Start: 2023-03-28 | End: 2023-03-28 | Stop reason: HOSPADM

## 2023-03-28 RX ADMIN — HYDROMORPHONE HYDROCHLORIDE 1 MG: 2 INJECTION, SOLUTION INTRAMUSCULAR; INTRAVENOUS; SUBCUTANEOUS at 18:46

## 2023-03-28 RX ADMIN — HEPARIN SODIUM 5000 UNITS: 10000 INJECTION INTRAVENOUS; SUBCUTANEOUS at 16:45

## 2023-03-28 RX ADMIN — GLYCOPYRROLATE 0.2 MG: 0.2 INJECTION INTRAMUSCULAR; INTRAVENOUS at 08:03

## 2023-03-28 RX ADMIN — Medication 3 G: at 08:00

## 2023-03-28 RX ADMIN — ONDANSETRON HYDROCHLORIDE 4 MG: 2 SOLUTION INTRAMUSCULAR; INTRAVENOUS at 08:30

## 2023-03-28 RX ADMIN — FENTANYL CITRATE 150 MCG: 50 INJECTION, SOLUTION INTRAMUSCULAR; INTRAVENOUS at 07:46

## 2023-03-28 RX ADMIN — SODIUM CHLORIDE, POTASSIUM CHLORIDE, SODIUM LACTATE AND CALCIUM CHLORIDE 100 ML/HR: 600; 310; 30; 20 INJECTION, SOLUTION INTRAVENOUS at 23:03

## 2023-03-28 RX ADMIN — FENTANYL CITRATE 200 MCG: 50 INJECTION, SOLUTION INTRAMUSCULAR; INTRAVENOUS at 11:52

## 2023-03-28 RX ADMIN — ALBUMIN (HUMAN) 250 ML: 12.5 SOLUTION INTRAVENOUS at 16:50

## 2023-03-28 RX ADMIN — PROPOFOL 150 MG: 10 INJECTION, EMULSION INTRAVENOUS at 07:46

## 2023-03-28 RX ADMIN — PROPOFOL 120 MCG/KG/MIN: 10 INJECTION, EMULSION INTRAVENOUS at 07:46

## 2023-03-28 RX ADMIN — HYDROMORPHONE HYDROCHLORIDE 0.5 MG: 1 INJECTION, SOLUTION INTRAMUSCULAR; INTRAVENOUS; SUBCUTANEOUS at 22:24

## 2023-03-28 RX ADMIN — FENTANYL CITRATE 200 MCG: 50 INJECTION, SOLUTION INTRAMUSCULAR; INTRAVENOUS at 13:10

## 2023-03-28 RX ADMIN — SODIUM CHLORIDE, POTASSIUM CHLORIDE, SODIUM LACTATE AND CALCIUM CHLORIDE: 600; 310; 30; 20 INJECTION, SOLUTION INTRAVENOUS at 07:48

## 2023-03-28 RX ADMIN — FENTANYL CITRATE 50 MCG: 50 INJECTION, SOLUTION INTRAMUSCULAR; INTRAVENOUS at 08:30

## 2023-03-28 RX ADMIN — VECURONIUM BROMIDE 8 MG: 1 INJECTION, POWDER, LYOPHILIZED, FOR SOLUTION INTRAVENOUS at 15:35

## 2023-03-28 RX ADMIN — GABAPENTIN 300 MG: 300 CAPSULE ORAL at 23:13

## 2023-03-28 RX ADMIN — Medication 10 MG: at 08:24

## 2023-03-28 RX ADMIN — PROPOFOL 50 MG: 10 INJECTION, EMULSION INTRAVENOUS at 07:49

## 2023-03-28 RX ADMIN — SODIUM CHLORIDE, POTASSIUM CHLORIDE, SODIUM LACTATE AND CALCIUM CHLORIDE: 600; 310; 30; 20 INJECTION, SOLUTION INTRAVENOUS at 06:48

## 2023-03-28 RX ADMIN — ACETAMINOPHEN 1000 MG: 500 TABLET ORAL at 23:13

## 2023-03-28 RX ADMIN — VECURONIUM BROMIDE 4 MG: 1 INJECTION, POWDER, LYOPHILIZED, FOR SOLUTION INTRAVENOUS at 16:51

## 2023-03-28 RX ADMIN — VECURONIUM BROMIDE 6 MG: 1 INJECTION, POWDER, LYOPHILIZED, FOR SOLUTION INTRAVENOUS at 14:00

## 2023-03-28 RX ADMIN — DEXAMETHASONE SODIUM PHOSPHATE 8 MG: 4 INJECTION, SOLUTION INTRAMUSCULAR; INTRAVENOUS at 08:30

## 2023-03-28 RX ADMIN — FENTANYL CITRATE 100 MCG: 50 INJECTION, SOLUTION INTRAMUSCULAR; INTRAVENOUS at 18:36

## 2023-03-28 RX ADMIN — GLYCOPYRROLATE 0.1 MG: 0.2 INJECTION INTRAMUSCULAR; INTRAVENOUS at 18:00

## 2023-03-28 RX ADMIN — VECURONIUM BROMIDE 4 MG: 1 INJECTION, POWDER, LYOPHILIZED, FOR SOLUTION INTRAVENOUS at 07:46

## 2023-03-28 RX ADMIN — HYDROMORPHONE HYDROCHLORIDE 1 MG: 2 INJECTION, SOLUTION INTRAMUSCULAR; INTRAVENOUS; SUBCUTANEOUS at 13:50

## 2023-03-28 RX ADMIN — Medication 2 G: at 12:00

## 2023-03-28 RX ADMIN — VECURONIUM BROMIDE 6 MG: 1 INJECTION, POWDER, LYOPHILIZED, FOR SOLUTION INTRAVENOUS at 09:46

## 2023-03-28 RX ADMIN — SODIUM CHLORIDE, POTASSIUM CHLORIDE, SODIUM LACTATE AND CALCIUM CHLORIDE: 600; 310; 30; 20 INJECTION, SOLUTION INTRAVENOUS at 19:37

## 2023-03-28 RX ADMIN — SUGAMMADEX 200 MG: 100 INJECTION, SOLUTION INTRAVENOUS at 21:00

## 2023-03-28 RX ADMIN — SUGAMMADEX 200 MG: 100 INJECTION, SOLUTION INTRAVENOUS at 20:55

## 2023-03-28 RX ADMIN — FENTANYL CITRATE 200 MCG: 50 INJECTION, SOLUTION INTRAMUSCULAR; INTRAVENOUS at 09:12

## 2023-03-28 RX ADMIN — VECURONIUM BROMIDE 4 MG: 1 INJECTION, POWDER, LYOPHILIZED, FOR SOLUTION INTRAVENOUS at 08:47

## 2023-03-28 RX ADMIN — ONDANSETRON HYDROCHLORIDE 4 MG: 2 SOLUTION INTRAMUSCULAR; INTRAVENOUS at 20:35

## 2023-03-28 RX ADMIN — Medication 2 G: at 20:00

## 2023-03-28 RX ADMIN — LIDOCAINE HYDROCHLORIDE 60 MG: 20 INJECTION, SOLUTION EPIDURAL; INFILTRATION; INTRACAUDAL; PERINEURAL at 07:46

## 2023-03-28 RX ADMIN — FAMOTIDINE 20 MG: 10 INJECTION INTRAVENOUS at 07:41

## 2023-03-28 RX ADMIN — VECURONIUM BROMIDE 4 MG: 1 INJECTION, POWDER, LYOPHILIZED, FOR SOLUTION INTRAVENOUS at 18:05

## 2023-03-28 RX ADMIN — VECURONIUM BROMIDE 8 MG: 1 INJECTION, POWDER, LYOPHILIZED, FOR SOLUTION INTRAVENOUS at 12:36

## 2023-03-28 RX ADMIN — MIDAZOLAM HYDROCHLORIDE 2 MG: 1 INJECTION, SOLUTION INTRAMUSCULAR; INTRAVENOUS at 07:43

## 2023-03-28 RX ADMIN — Medication 2 G: at 16:00

## 2023-03-28 RX ADMIN — FENTANYL CITRATE 200 MCG: 50 INJECTION, SOLUTION INTRAMUSCULAR; INTRAVENOUS at 09:39

## 2023-03-28 RX ADMIN — VECURONIUM BROMIDE 6 MG: 1 INJECTION, POWDER, LYOPHILIZED, FOR SOLUTION INTRAVENOUS at 11:00

## 2023-03-28 RX ADMIN — MIDAZOLAM HYDROCHLORIDE 3 MG: 1 INJECTION, SOLUTION INTRAMUSCULAR; INTRAVENOUS at 07:41

## 2023-03-28 RX ADMIN — FENTANYL CITRATE 100 MCG: 50 INJECTION, SOLUTION INTRAMUSCULAR; INTRAVENOUS at 08:50

## 2023-03-28 RX ADMIN — ALBUMIN (HUMAN) 250 ML: 12.5 SOLUTION INTRAVENOUS at 13:40

## 2023-03-28 RX ADMIN — PROPOFOL 140 MCG/KG/MIN: 10 INJECTION, EMULSION INTRAVENOUS at 08:28

## 2023-03-28 RX ADMIN — VECURONIUM BROMIDE 6 MG: 1 INJECTION, POWDER, LYOPHILIZED, FOR SOLUTION INTRAVENOUS at 07:54

## 2023-03-28 RX ADMIN — SUCCINYLCHOLINE CHLORIDE 140 MG: 20 INJECTION, SOLUTION INTRAMUSCULAR; INTRAVENOUS at 07:46

## 2023-03-28 RX ADMIN — DOCUSATE SODIUM 100 MG: 100 CAPSULE, LIQUID FILLED ORAL at 23:13

## 2023-03-28 NOTE — ANESTHESIA PREPROCEDURE EVALUATION
Relevant Problems   No relevant active problems       Anesthetic History   No history of anesthetic complications  PONV          Review of Systems / Medical History  Patient summary reviewed, nursing notes reviewed and pertinent labs reviewed    Pulmonary  Within defined limits      Sleep apnea: CPAP           Neuro/Psych   Within defined limits           Cardiovascular  Within defined limits                     GI/Hepatic/Renal  Within defined limits   GERD: well controlled           Endo/Other  Within defined limits      Morbid obesity     Other Findings              Physical Exam    Airway  Mallampati: II  TM Distance: 4 - 6 cm  Neck ROM: normal range of motion   Mouth opening: Normal     Cardiovascular    Rhythm: regular  Rate: normal         Dental  No notable dental hx       Pulmonary  Breath sounds clear to auscultation               Abdominal         Other Findings            Anesthetic Plan    ASA: 2  Anesthesia type: general            Anesthetic plan and risks discussed with: Patient

## 2023-03-28 NOTE — BRIEF OP NOTE
Brief Postoperative Note    Patient: Brooks Lewis  YOB: 1965  MRN: [id number]    Date of Procedure: 3/28/2023     Pre-Op Diagnosis: BREAST CANCER    Post-Op Diagnosis: Same as preoperative diagnosis.       Procedure(s):  REMOVAL LEFT BREAST IMPLANT/PECTORALIS MUSCLE FLAP/BILATERAL BREAST RECONSTRUCTION WITH KELLI MICROSURGICAL FREE FLAPS, RIGHT TOTAL BREAST MASTECTOMY  RIGHT TOTAL BREAST MASTECTOMY    Surgeon(s):  MD Stefany Saldaña Lu Spar., MD Micheal Cerrato MD    Surgical Assistant: Surg Asst-1: Michelle BROWN    Anesthesia: General     Estimated Blood Loss (mL): Minimal    Complications: None    Specimens: * No specimens in log *     Implants: * No implants in log *    Drains: * No LDAs found *    Findings: right breast    Electronically Signed by Riaz Montiel MD on 4/98/1778 at 9:14 AM

## 2023-03-28 NOTE — H&P
Pre-op History and Physical    CC: BREAST CANCER   HPI: 61y.o. year old female with BREAST CANCER for Procedure(s):  REMOVAL LEFT BREAST IMPLANT/PECTORALIS MUSCLE FLAP/BILATERAL BREAST RECONSTRUCTION WITH KELLI MICROSURGICAL FREE FLAPS, RIGHT TOTAL BREAST MASTECTOMY  RIGHT TOTAL BREAST MASTECTOMY.   Past medical history:   Past Medical History:   Diagnosis Date    Breast cancer ([facility] [address] 2008    LEFT breast cancer    GERD (gastroesophageal reflux disease)     Nausea & vomiting     Sleep apnea       Past surgical history:   Past Surgical History:   Procedure Laterality Date    HX  SECTION      x2    HX COLONOSCOPY      HX HERNIA REPAIR      umbilical, childhood    HX MASTECTOMY  2008    LEFT     HX ORTHOPAEDIC Left     ankle fx repair    HX OTHER SURGICAL Left     left implant replaced    HX OTHER SURGICAL  1989    ectopic pregnancy    HX TONSILLECTOMY      HX TUBAL LIGATION  1990    HX WISDOM TEETH EXTRACTION        Family history:   Family History   Problem Relation Age of Onset    Hypertension Mother     High Cholesterol Mother     Hypertension Brother       Social history:   Social History     Socioeconomic History    Marital status:      Spouse name: Not on file    Number of children: Not on file    Years of education: Not on file    Highest education level: Not on file   Occupational History    Not on file   Tobacco Use    Smoking status: Never    Smokeless tobacco: Never   Vaping Use    Vaping Use: Never used   Substance and Sexual Activity    Alcohol use: Not Currently     Comment: one glass of wine every 2-3 months, none in last 30 d    Drug use: Never    Sexual activity: Not on file   Other Topics Concern    Not on file   Social History Narrative    Not on file     Social Determinants of Health     Financial Resource Strain: Not on file   Food Insecurity: Not on file   Transportation Needs: Not on file   Physical Activity: Not on file   Stress: Not on file   Social Connections: Not on file   Intimate Partner Violence: Not on file   Housing Stability: Not on file      Home Medications:   Prior to Admission medications    Medication Sig Start Date End Date Taking? Authorizing Provider   amLODIPine (NORVASC) 2.5 mg tablet Take 2.5 mg by mouth daily. Yes Provider, Historical   ibuprofen/diphenhydramine cit (ADVIL PM PO) Take 1 Dose by mouth nightly as needed. Yes Provider, Historical   meloxicam (MOBIC) 15 mg tablet Take 15 mg by mouth as needed for Pain. Yes Provider, Historical   zolpidem (AMBIEN) 5 mg tablet  1/17/18   Provider, Historical      Allergies: Allergies   Allergen Reactions    Aspirin Swelling     Eye swelling      Review of systems:  Denies headache, fever, chills, weight change, congestion, sore throat, chest pain, shortness of breath, nausea, vomiting, diarrhea, constipation, abdominal pain, generalized weakness, muscle or joint pain, and rash. Physical Exam:  Vitals: Blood pressure (!) 151/75, pulse 82, temperature 98.6 °F (37 °C), resp. rate 20, height 5' 7\" (1.702 m), weight 125.5 kg (276 lb 10.8 oz), last menstrual period 02/15/2013, SpO2 99 %.    General: awake and alert, NAD  Neck: supple  Cor: RRR  Lungs: clear  Abdomen: soft, non-tender, non-distended  Extremities: no edema  Skin: no rash    Impression: BREAST CANCER    Plan:  Procedure(s):  REMOVAL LEFT BREAST IMPLANT/PECTORALIS MUSCLE FLAP/BILATERAL BREAST RECONSTRUCTION WITH KELLI MICROSURGICAL FREE FLAPS, RIGHT TOTAL BREAST MASTECTOMY  RIGHT TOTAL BREAST MASTECTOMY

## 2023-03-28 NOTE — OP NOTES
[facility]  OPERATIVE REPORT    Name:  Emani Ryan  MR#:  [id number]  :  1965  ACCOUNT #:  [de-identified]  DATE OF SERVICE:  2023    PREOPERATIVE DIAGNOSIS:  History of left breast carcinoma. POSTOPERATIVE DIAGNOSIS:  History of left breast carcinoma. PROCEDURE PERFORMED:  Right prophylactic mastectomy skin-sparing with immediate reconstruction by Dr. Diomedes Munoz. SURGEON:  Ruth Jorge MD    ASSISTANT:  JEANETTE Robertson    ANESTHESIA:  General.    COMPLICATIONS:  None. SPECIMENS REMOVED:  Right breast.    IMPLANTS:  None. ESTIMATED BLOOD LOSS:  Minimal.    INDICATIONS:  The patient is a 74-year-old female who has a history of left breast cancer with reconstruction, who is having a revisional reconstruction of her left breast with a KELLI flap. She has opted for right breast prophylactic mastectomy with reconstruction as well. PROCEDURE:  After the satisfactory general endotracheal anesthesia, the patient was prepped and draped in the usual sterile fashion. A circumareolar incision with a vertical inferior extension was made to excise the lower pole vertical scar and the nipple. This was deepened through the subcutaneous tissue with Bovie cautery. Skin flaps were then raised superior to the clavicle, medial to the sternum, inferior to the inframammary fold, and inferior to the inframammary fold, lateral to the latissimus dorsi muscle. Breast was then removed off the chest wall subfascially maintaining hemostasis with the Bovie cautery. At the lateral border of pectoralis major muscle, the breast was mobilized and amputated at the level of the axilla. All dissection planes were hemostatic. The wound was packed with a wet laparotomy pad. The specimens were oriented and sent to Pathology and at this point in time of the case, Dr. Brigette Olivier continued with the immediate flap reconstruction.         Alban Adame MD [id number]  D:  03/28/2023 9:57  T:  03/28/2023 12:21  JOB #:  [id number]  CC:  MD Jeana Chilel MD

## 2023-03-28 NOTE — H&P
HISTORY OF PRESENT ILLNESS  Sanjana Neville is a 61 y.o. female. HPI  NEW patient referral who was last seen in our office in . She has a history of LEFT breast cancer and treated with a mastectomy/reconstruction in . The patient denies any palpable lumps, nipple inversion or discharge in the RIGHT breast.         Mammogram done at [facility] 2022 BI RADS 1. History of LEFT mastectomy with reconstruction in . No chemo or radiation was needed. Review of Systems   Constitutional: Negative. HENT: Negative. Eyes: Negative. Respiratory: Negative. Cardiovascular: Negative. Gastrointestinal: Negative. Genitourinary: Negative. Musculoskeletal: Negative. Skin: Negative. Neurological:  Positive for headaches. Endo/Heme/Allergies: Negative. Psychiatric/Behavioral: Negative.                    Past Medical History:   Diagnosis Date    Breast cancer ([facility] [address]       LEFT breast cancer               Past Surgical History:   Procedure Laterality Date    HX  SECTION         x2    HX HERNIA REPAIR         umbilical    HX MASTECTOMY   2008     LEFT     HX TONSILLECTOMY        HX TUBAL LIGATION             Social History            Socioeconomic History    Marital status:        Spouse name: Not on file    Number of children: Not on file    Years of education: Not on file    Highest education level: Not on file   Occupational History    Not on file   Tobacco Use    Smoking status: Never    Smokeless tobacco: Never   Substance and Sexual Activity    Alcohol use: No    Drug use: Not on file    Sexual activity: Not on file   Other Topics Concern    Not on file   Social History Narrative    Not on file      Social Determinants of Health      Financial Resource Strain: Not on file   Food Insecurity: Not on file   Transportation Needs: Not on file   Physical Activity: Not on file   Stress: Not on file   Social Connections: Not on file   Intimate Partner Violence: Not on file   Housing Stability: Not on file                Current Outpatient Medications on File Prior to Visit   Medication Sig Dispense Refill    rizatriptan (MAXALT) 10 mg tablet     0    zolpidem (AMBIEN) 5 mg tablet     0    Cetirizine (ZYRTEC) 10 mg cap Take  by mouth. No current facility-administered medications on file prior to visit. Allergies   Allergen Reactions    Aspirin Unknown (comments)    Ibuprofen Swelling         OB History    No obstetric history on file. Obstetric Comments   Menarche:  14. LMP: current. # of Children:  2. Age at Delivery of First Child:  23.   Hysterectomy/oophorectomy:  NO/NO. Breast Bx:  Yes, left in 2008. Hx of Breast Feeding:  no. BCP:  no. Hormone therapy:  no.                       ROS           Physical Exam  Exam conducted with a chaperone present. Constitutional:       Appearance: She is well-developed. She is not diaphoretic. HENT:      Head: Normocephalic and atraumatic. Right Ear: External ear normal.      Left Ear: External ear normal.   Eyes:      General: No scleral icterus. Right eye: No discharge. Left eye: No discharge. Pupils: Pupils are equal, round, and reactive to light. Neck:      Thyroid: No thyromegaly. Vascular: No JVD. Trachea: No tracheal deviation. Cardiovascular:      Rate and Rhythm: Normal rate and regular rhythm. Heart sounds: Normal heart sounds. Pulmonary:      Effort: Pulmonary effort is normal. No tachypnea, accessory muscle usage or respiratory distress. Breath sounds: Normal breath sounds. No stridor. Chest:   Breasts:     Breasts are symmetrical.      Right: No inverted nipple, mass, nipple discharge, skin change or tenderness. Left: No inverted nipple, mass, nipple discharge, skin change or tenderness. Abdominal:      General: There is no distension. Palpations: Abdomen is soft. There is no mass. Tenderness:  There is no abdominal tenderness. Musculoskeletal:         General: Normal range of motion. Cervical back: Normal range of motion and neck supple. Lymphadenopathy:      Cervical: No cervical adenopathy. Skin:     General: Skin is warm and dry. Neurological:      Mental Status: She is alert and oriented to person, place, and time. Psychiatric:         Speech: Speech normal.         Behavior: Behavior normal.         Thought Content: Thought content normal.         Judgment: Judgment normal.            ASSESSMENT and PLAN      ICD-10-CM ICD-9-CM     1. History of breast cancer  Z85.3 V10.3         2. Capsular contracture of breast implant, initial encounter  T85.44XA 611.83            New patient presents discuss surgery and is doing well overall. Upon examination noted breast implant of LEFT breast with mild encapsulation, normal breast exam of RIGHT breast. Patient states wanting BILATERAL breast reconstruction with either KELLI flap or implants. Explained to patient of the procedures and that she will need a mammogram before procedure. Patient stated she has an appointment on January 4th to meet with the plastic surgeon to discuss the options. Follow up after plastic surgeon visit. This plan was reviewed with the patient and patient agrees. All questions were answered.

## 2023-03-29 ENCOUNTER — APPOINTMENT (OUTPATIENT)
Dept: NON INVASIVE DIAGNOSTICS | Age: 58
DRG: 582 | End: 2023-03-29
Attending: SPECIALIST
Payer: COMMERCIAL

## 2023-03-29 ENCOUNTER — APPOINTMENT (OUTPATIENT)
Dept: NON INVASIVE DIAGNOSTICS | Age: 58
DRG: 582 | End: 2023-03-29
Attending: NURSE PRACTITIONER
Payer: COMMERCIAL

## 2023-03-29 LAB
ANION GAP SERPL CALC-SCNC: 4 MMOL/L (ref 5–15)
ANION GAP SERPL CALC-SCNC: 6 MMOL/L (ref 5–15)
ATRIAL RATE: 101 BPM
BUN SERPL-MCNC: 10 MG/DL (ref 6–20)
BUN SERPL-MCNC: 10 MG/DL (ref 6–20)
BUN/CREAT SERPL: 9 (ref 12–20)
BUN/CREAT SERPL: 9 (ref 12–20)
CALCIUM SERPL-MCNC: 8 MG/DL (ref 8.5–10.1)
CALCIUM SERPL-MCNC: 8 MG/DL (ref 8.5–10.1)
CALCULATED P AXIS, ECG09: 40 DEGREES
CALCULATED R AXIS, ECG10: 38 DEGREES
CALCULATED T AXIS, ECG11: 27 DEGREES
CHLORIDE SERPL-SCNC: 109 MMOL/L (ref 97–108)
CHLORIDE SERPL-SCNC: 111 MMOL/L (ref 97–108)
CO2 SERPL-SCNC: 23 MMOL/L (ref 21–32)
CO2 SERPL-SCNC: 25 MMOL/L (ref 21–32)
CREAT SERPL-MCNC: 1.06 MG/DL (ref 0.55–1.02)
CREAT SERPL-MCNC: 1.12 MG/DL (ref 0.55–1.02)
DIAGNOSIS, 93000: NORMAL
ECHO AO ASC DIAM: 3.3 CM
ECHO AO ASCENDING AORTA INDEX: 1.42 CM/M2
ECHO LA DIAMETER INDEX: 0.99 CM/M2
ECHO LA DIAMETER: 2.3 CM
ECHO LV FRACTIONAL SHORTENING: 45 % (ref 28–44)
ECHO LV INTERNAL DIMENSION DIASTOLE INDEX: 1.72 CM/M2
ECHO LV INTERNAL DIMENSION DIASTOLIC: 4 CM (ref 3.9–5.3)
ECHO LV INTERNAL DIMENSION SYSTOLIC INDEX: 0.95 CM/M2
ECHO LV INTERNAL DIMENSION SYSTOLIC: 2.2 CM
ECHO LV IVSD: 1 CM (ref 0.6–0.9)
ECHO LV MASS 2D: 127.1 G (ref 67–162)
ECHO LV MASS INDEX 2D: 54.8 G/M2 (ref 43–95)
ECHO LV POSTERIOR WALL DIASTOLIC: 1 CM (ref 0.6–0.9)
ECHO LV RELATIVE WALL THICKNESS RATIO: 0.5
ECHO LVOT AREA: 3.5 CM2
ECHO LVOT DIAM: 2.1 CM
ECHO PV MAX VELOCITY: 1 M/S
ECHO PV PEAK GRADIENT: 4 MMHG
GLUCOSE SERPL-MCNC: 133 MG/DL (ref 65–100)
GLUCOSE SERPL-MCNC: 139 MG/DL (ref 65–100)
MAGNESIUM SERPL-MCNC: 1.5 MG/DL (ref 1.6–2.4)
P-R INTERVAL, ECG05: 212 MS
POTASSIUM SERPL-SCNC: 3.6 MMOL/L (ref 3.5–5.1)
POTASSIUM SERPL-SCNC: 5.2 MMOL/L (ref 3.5–5.1)
Q-T INTERVAL, ECG07: 346 MS
QRS DURATION, ECG06: 98 MS
QTC CALCULATION (BEZET), ECG08: 448 MS
SODIUM SERPL-SCNC: 138 MMOL/L (ref 136–145)
SODIUM SERPL-SCNC: 140 MMOL/L (ref 136–145)
VENTRICULAR RATE, ECG03: 101 BPM

## 2023-03-29 PROCEDURE — 93306 TTE W/DOPPLER COMPLETE: CPT | Performed by: INTERNAL MEDICINE

## 2023-03-29 PROCEDURE — 65610000006 HC RM INTENSIVE CARE

## 2023-03-29 PROCEDURE — 36415 COLL VENOUS BLD VENIPUNCTURE: CPT

## 2023-03-29 PROCEDURE — 80048 BASIC METABOLIC PNL TOTAL CA: CPT

## 2023-03-29 PROCEDURE — 74011250636 HC RX REV CODE- 250/636: Performed by: NURSE PRACTITIONER

## 2023-03-29 PROCEDURE — 74011250637 HC RX REV CODE- 250/637: Performed by: PLASTIC SURGERY

## 2023-03-29 PROCEDURE — 74011250636 HC RX REV CODE- 250/636: Performed by: PLASTIC SURGERY

## 2023-03-29 PROCEDURE — 99222 1ST HOSP IP/OBS MODERATE 55: CPT | Performed by: SPECIALIST

## 2023-03-29 PROCEDURE — 83735 ASSAY OF MAGNESIUM: CPT

## 2023-03-29 PROCEDURE — 93271 ECG/MONITORING AND ANALYSIS: CPT

## 2023-03-29 PROCEDURE — 77010033678 HC OXYGEN DAILY

## 2023-03-29 PROCEDURE — 94660 CPAP INITIATION&MGMT: CPT

## 2023-03-29 PROCEDURE — 74011000258 HC RX REV CODE- 258: Performed by: PLASTIC SURGERY

## 2023-03-29 PROCEDURE — 93306 TTE W/DOPPLER COMPLETE: CPT

## 2023-03-29 PROCEDURE — APPSS30 APP SPLIT SHARED TIME 16-30 MINUTES: Performed by: NURSE PRACTITIONER

## 2023-03-29 RX ORDER — ACETAMINOPHEN 325 MG/1
650 TABLET ORAL
Status: DISCONTINUED | OUTPATIENT
Start: 2023-03-31 | End: 2023-03-31 | Stop reason: HOSPADM

## 2023-03-29 RX ORDER — MAGNESIUM SULFATE HEPTAHYDRATE 40 MG/ML
2 INJECTION, SOLUTION INTRAVENOUS ONCE
Status: COMPLETED | OUTPATIENT
Start: 2023-03-29 | End: 2023-03-29

## 2023-03-29 RX ORDER — IBUPROFEN 600 MG/1
600 TABLET ORAL
Status: DISCONTINUED | OUTPATIENT
Start: 2023-03-30 | End: 2023-03-31 | Stop reason: HOSPADM

## 2023-03-29 RX ORDER — MORPHINE SULFATE 2 MG/ML
2 INJECTION, SOLUTION INTRAMUSCULAR; INTRAVENOUS
Status: DISCONTINUED | OUTPATIENT
Start: 2023-03-29 | End: 2023-03-31 | Stop reason: HOSPADM

## 2023-03-29 RX ORDER — IBUPROFEN 600 MG/1
600 TABLET ORAL EVERY 6 HOURS
Status: DISPENSED | OUTPATIENT
Start: 2023-03-29 | End: 2023-03-30

## 2023-03-29 RX ADMIN — GABAPENTIN 300 MG: 300 CAPSULE ORAL at 21:42

## 2023-03-29 RX ADMIN — ACETAMINOPHEN 1000 MG: 500 TABLET ORAL at 21:42

## 2023-03-29 RX ADMIN — GABAPENTIN 300 MG: 300 CAPSULE ORAL at 16:25

## 2023-03-29 RX ADMIN — CEFAZOLIN 3 G: 10 INJECTION, POWDER, FOR SOLUTION INTRAVENOUS at 18:30

## 2023-03-29 RX ADMIN — DOCUSATE SODIUM 100 MG: 100 CAPSULE, LIQUID FILLED ORAL at 18:30

## 2023-03-29 RX ADMIN — HEPARIN SODIUM 5000 UNITS: 5000 INJECTION INTRAVENOUS; SUBCUTANEOUS at 06:20

## 2023-03-29 RX ADMIN — ACETAMINOPHEN 1000 MG: 500 TABLET ORAL at 04:19

## 2023-03-29 RX ADMIN — MAGNESIUM SULFATE HEPTAHYDRATE 2 G: 40 INJECTION, SOLUTION INTRAVENOUS at 11:12

## 2023-03-29 RX ADMIN — CEFAZOLIN 3 G: 10 INJECTION, POWDER, FOR SOLUTION INTRAVENOUS at 02:59

## 2023-03-29 RX ADMIN — ACETAMINOPHEN 1000 MG: 500 TABLET ORAL at 16:25

## 2023-03-29 RX ADMIN — HEPARIN SODIUM 5000 UNITS: 5000 INJECTION INTRAVENOUS; SUBCUTANEOUS at 21:43

## 2023-03-29 RX ADMIN — DOCUSATE SODIUM 100 MG: 100 CAPSULE, LIQUID FILLED ORAL at 08:14

## 2023-03-29 RX ADMIN — ACETAMINOPHEN 1000 MG: 500 TABLET ORAL at 10:29

## 2023-03-29 RX ADMIN — CEFAZOLIN 3 G: 10 INJECTION, POWDER, FOR SOLUTION INTRAVENOUS at 10:29

## 2023-03-29 RX ADMIN — GABAPENTIN 300 MG: 300 CAPSULE ORAL at 08:14

## 2023-03-29 NOTE — PROGRESS NOTES
0700: Bedside and Verbal shift change report given to Jovana Llamas (oncoming nurse) by Lala Weeks (offgoing nurse). Report included the following information SBAR, Kardex, Intake/Output, MAR, Recent Results, and Cardiac Rhythm SR with PVCs . 0800: assessment completed    1000: Dr. Phoebe English called and made aware of cardiology's orders and magnesium result     1005: Amrit Xiong Served regarding magnesium lab result, new order received    1045: castillo removed & patient assisted to reclining chair. 1200: reassessment completed    1600: reassessment completed    1900: Bedside and Verbal shift change report given to Corina Aranda (oncoming nurse) by Jovana Llamas (offgoing nurse). Report included the following information SBAR, Kardex, Intake/Output, MAR, Recent Results, and Cardiac Rhythm SR with PVCs .

## 2023-03-29 NOTE — PROGRESS NOTES
Bedside and Verbal shift change report given to [address] [place] [address] (oncoming nurse) by Peg Monreal RN (offgoing nurse). Report included the following information SBAR, Intake/Output, MAR, Recent Results, Cardiac Rhythm: NSR and Alarm Parameters . Primary Nurse Bud Hammer RN and Peg Monreal RN performed a dual skin assessment on this patient No impairment noted  Sj score is see flowsheets    See flowsheets for all assessments, see MAR for all medication administrations. Bedside and Verbal shift change report given to NATALI Roberto (oncoming nurse) by Natacha Barkley RN (offgoing nurse). Report included the following information SBAR, Intake/Output, MAR, Recent Results, Cardiac Rhythm: and Alarm Parameters .

## 2023-03-29 NOTE — PROGRESS NOTES
Patient declined hospital issued cpap. Patient stated her family will bring in her home unit tomorrow.  Will continue to follow

## 2023-03-29 NOTE — PROGRESS NOTES
Problem: Falls - Risk of  Goal: *Absence of Falls  Description: Document Aaron Cespedes Fall Risk and appropriate interventions in the flowsheet.   Outcome: Progressing Towards Goal  Note: Fall Risk Interventions:

## 2023-03-29 NOTE — OP NOTES
Name: Sanjana Neville  Surgeon: Kirsten Cervantes MD   Account #: [de-identified] Surgery Date: 3/28/2023   : 1965  Age: 61 y.o. Location: [facility]    OPERATIVE REPORT     PREOPERATIVE DIAGNOSES:   1. History of left breast cancer. 2.  Acquired absence of bilateral breasts. POSTOPERATIVE DIAGNOSES:   1. History of left breast cancer. 2.  Acquired absence of bilateral breasts. OPERATIVE PROCEDURE:   1. Excision of left mastectomy scar, 15 cm (Dr. Jose A Gonzalez as surgeon). 2.  Removal of intact left breast silicone gel implant (Dr. Jose A Gonzalez as surgeon). 3.  Left breast total capsulectomy (Dr. Jose A Gonzalez as surgeon). 4.  Left pectoralis major muscle flaps (Dr. Jose A Gonzalez as surgeon). 5.  Bilateral extrapleural rib resection (Dr. Araceli Portillo on the right, Dr. Jose A Gonzalez on the left). 6.  Delayed left breast reconstruction and immediate right breast reconstruction with KELLI (deep inferior epigastric artery ) flap (both as Co-Surgeons). 7.  Intraoperative bilateral TAP (transversus abdominis plane) regional block (injection) with ultrasound guidance (Dr. Araceli Portillo on the right, Dr. Jose A Gonzalez on the left). SURGEON:  Sukh Gonzalez MD    CO- SURGEON:  Dinh Cartwright MD     ANESTHESIA: General endotracheal.     INDICATIONS: The patient is a 61 y.o. female who was treated for left breast cancer several years ago with mastectomy and implant reconstruction. She had a balancing wise pattern breast reduction on the right. She did not require radiation therapy. She desires implant removal and autologous tissue reconstruction. She was planning prophylactic mastectomy on the right with Dr. Daphne Mckee. Bilateral microsurgical  flap reconstruction was recommended. The procedure, as well as the alternatives, possible complications, and anticipated scars were outlined with the patient and she agrees to proceed having given her informed and written consent.  CT angiography was performed pre-operatively to map out the courses of the perforating blood vessels to the flap tissue of the lower abdomen and to guide intraoperative dissection. PROCEDURE IN DETAIL: Please note that Dr. Silver King is a co-surgeon for this operation due to the length and complexity of the microsurgical case. A co-surgeon is required for the operation because there is no other qualified assistant, fellow, or resident otherwise available. The complexity of the  flaps involves dissection of the perforating blood vessels through the fibers of the rectus abdominis muscle during flap harvest and the extra time required for this (at least 50% longer), which is significantly more technically difficult than a standard free TRAM flap. The -22 modifier is also used due to this increased complexity and time requirement. Dr. Jennifer Schmitt was assigned preparation of the left chest and harvest of the left hemiabdominal KELLI flap. Dr. Silver King was assigned preparation of the right chest and harvest of the right hemiabdominal KELLI flap. Both worked independently and concomitantly during these portions of the case. They then assisted each other under the microscope during the microsurgical portion of the procedure, then closed their respective sides. The patient was marked in the upright position in the holding area. She received preoperative intravenous antibiotics. She was taken to the operating room and placed in the supine position with all pressure points padded. Pneumatic compression stockings were applied to bilateral lower extremities. Following successful general endotracheal anesthesia, a De Leon catheter was placed. The arms were tucked to her sides with appropriate padding. The chest and abdomen were prepped and draped in the usual sterile fashion.      Dr. Анна Baptiste began with a skin sparing, prophylactic total mastectomy of the right breast through a vertical incision to include the nipple areolar complex and the vertical portion of the wise pattern scar. This will be dictated separately by him. At the same time, Dr. Zane Mata harvested the left hemiabdominal KELLI flap. The upper incision was made with a scalpel. Dissection continued with the Bovie down to the abdominal wall fascia. Next, dissection continued in a cephalad direction to elevate the upper abdominal flap to facilitate closure. Next, the midline incision was made with a scalpel and completed with the bovie down to fascia. The umbilicus was circumscribed. The lower abdominal incision was then made with a scalpel. Under loupe magnification, the superficial inferior epigastric vein was identified and dissected inferiorly. It was quite small. It was divided between clips and preserved for possible later use for additional venous drainage for the flap. Dissection in the lower incision continued down to the abdominal wall with the Bovie. Next, starting laterally, the KELLI flap was elevated on the left side, based on two lateral row perforators with palpable pulses. Next, the anterior fascia was split and reflected to expose the rectus abdominis muscle. The course of the perforators was traced through the fibers of the rectus abdominis, to the source vessel on the posterior aspect of the muscle, dividing branches between hemostatic clips. There was significant scar tissue and fibrosis in the lower abdominal wall, presumably from previous Caesarean section. A small amount of muscle was divided between the two perforators. The vascular pedicle was then dissected down into the pelvis at its origin off the iliac vessels. The pedicle vessels were bathed in papaverine, and the flap was temporarily secured in position using skin staples. Once Dr. Jigar Packer had completed the mastectomy, Dr. Roderick Medina began in the right chest with preparation of the recipient vessels, specifically the internal mammary vessels. The breast pocket was irrigated and hemostasis assured.   A segment of muscle was removed over the third rib. The third costal cartilage was resected in extrapleural fashion to expose the internal mammary vessels. Under loupe magnification, they were dissected circumferentially, dividing branches between hemostatic clips. The vessels were bathed in papaverine and covered with saline-moistened gauze until ready for use. Next, Dr. Phoebe English moved to preparation of the left chest and Dr. Ruthann Cesar to harvest of the right hemiabdominal KELLI flap. In the right hemiabdomen, a two medial row  KELLI flap was elevated, dissecting the perforating blood vessels through the rectus abdominis muscle fibers, dividing branches between hemostatic clips. A small amount of muscle was divided between the two perforators. The vessels of the vascular pedicle of the flap were dissected circumferentially at their origins off the iliac vessels and bathed in papaverine. The lower portion of the abdominal wall was similarly fibrotic, with extensive scar tissue affecting the dissection in this region. There were, however, no untoward events noted. The flap was stapled into position until ready for use. In the left chest, the 15 cm mastectomy scar was excised with a scalpel. Dissection continued with the bovie and the capsule was entered. An intact 800 cc high profile silicone gel implant was removed. The entire breast capsule was removed with the bovie and sent for permanent section. Next, the pectoralis major muscle was elevated in flap fashion, based on the thoracoacromial vessels which were identified and preserved, and  it from the overlying skin and subcutaneous tissues, to the extent of the pre-operative markings. The breast pocket was irrigated and hemostasis assured. The pectoralis muscle flap was then returned to its anatomic position on the chest wall and secured with vicryl sutures.   She had mild synmastia noted as a result of the previous placement of her implant, and this was repaired with interrupted #3-0 vicryl sutures to the chest wall. A segment of muscle was removed over the fourth rib. The fourth costal cartilage was resected in extrapleural fashion to expose the internal mammary vessels. Under loupe magnification, they were dissected circumferentially, dividing branches between hemostatic clips. The vessels were bathed in papaverine and covered with saline-moistened gauze until ready for use. The patient received 5000 units subcutaneous heparin. Next, the left hemiabdominal KELLI flap was harvested by ligating the origin of the vessels with hemostatic clips. It was brought to the right chest and placed in the pocket. The skin island was marked as a [place] in the position of the nipple areolar complex. The remainder was de-epithelialized with scissors. The flap was positioned and the operating microscope brought in. The flap vessels were prepared under the microscope and irrigated with heparin saline solution. The venous anastomosis was performed first.  The internal mammary vein was clipped inferiorly and clamped superiorly. It was divided, prepared, and irrigated. Next an end-to-end anastomosis was performed with a 3.0 mm venous  in antegrade fashion. The clamp was taken down and excellent backfill noted. Next the arterial anastomosis was performed. The internal mammary artery was clipped inferiorly and clamped superiorly. It was divided, prepared, and irrigated. Then the anastomosis was performed with interrupted #9-0 nylon sutures in hand sewn fashion. The clamps were taken down and excellent perfusion of the flap noted. Exparel had been expanded with 40 mL injectable saline and 40 mL 0.25% plain marcaine to 100 mL total.  Twenty mL was infiltrated laterally in the breast pocket along the intercostal nerves. A 19 Fr.  Cuco Melendez drain was placed in the breast pocket, brought out through a #15 blade stab incision in the lateral inframammary fold and secured with a #2-0 nylon suture. The flap was anchored to the chest wall superiorly and medially with #2-0 vicryl sutures. The flap was stapled in the breast pocket. Next the right hemiabdominal KELLI flap was harvested by ligating the origin of the vessels with hemostatic clips. It was brought to the left chest and placed in the pocket. Additional native breast skin was excised from the lower pole and sent for routine section. The skin island was marked as a large oval.  The remainder was de-epithelialized with scissors. The flap was positioned, the vessels were prepared under the microscope, and they were irrigated with heparin saline solution. The venous anastomosis was performed first.  The internal mammary vein was clipped inferiorly and clamped superiorly. It was divided, prepared, and irrigated. Next an end-to-end anastomosis was performed with a 3.0 mm venous  in antegrade fashion. The clamp was taken down and excellent backfill noted. Next the arterial anastomosis was performed. The internal mammary artery was clipped inferiorly and clamped superiorly. It was divided, prepared, and irrigated. Then the anastomosis was performed with interrupted #9-0 nylon sutures in hand sewn fashion. The clamps were taken down and excellent perfusion of the flap noted. Twentyt mL of exparel was infiltrated laterally in the breast pocket along the intercostal nerves. A 19 Fr. Thea Pellant drain was placed in the breast pocket, brought out through a #15 blade stab incision in the lateral inframammary fold and secured with a #2-0 nylon suture. The flap was anchored to the chest wall superomedially with a #2-0 vicryl suture. The flap was stapled in the breast pocket. Both KELLI flaps were then inset with buried #3-0 monocryl sutures, followed by #3-0 barbed monocryl suture in running subcuticular fashion. The abdomen was then closed after placing the patient in semi-Stahl position.  No fascia nor muscle had been removed from the abdomen. The anterior rectus sheath was closed primarily on each side with #2-0 barbed PDS suture in double-layer, running whipstitch fashion. No mesh was used. Two 19 Fr Oscar drains were placed in the abdomen and brought out through separate inferolateral #15 blade stab incisions and secured with #2-0 nylon sutures. Next, bilateral transversus abdominis plane (TAP) regional blocks were performed through the open wound and exposed abdominal wall. With ultrasound guidance, the plane between the internal oblique and transversus abdominis muscles was identified on each side, and 20 mL of the Exparel mixture was injected on each respective side with a hypodermic needle. The remainder of the Exparel was injected directly into both rectus abdominis muscles. Jina's fascia was then closed with interrupted #2-0 vicryl sutures. Skin closure was completed with buried dermal #3-0 monocryl sutures followed by #3-0 barbed monocryl barbed suture in running subcuticular fashion. A core of skin and adipose tissue was removed with a scalpel and bovie so as to accept the umbilicus. The umbilicus was inset with interrupted buried dermal #4-0 monocryl sutures. All of the wounds were cleaned. Drains were hooked to bulb suction. Skin glue was applied to the incisions. Vioptix probes were placed on each flap skin island and appropriate readings noted. They were secured with tegaderm dressings. Instrument, sponge, and needle counts were reported to be correct. The patient was allowed to awaken from anesthesia. She was extubated without apparent complication. She was transferred to the bed in semi-Stahl position and was then taken to the recovery room in excellent condition. COMPLICATIONS: The patient was noted to have intermittent bigeminy and trigeminy throughout the case. There was no significant hemodynamic instability noted at any point.      EBL: 150 mL     IV Fluid: 8500 mL crystalloid, 500 mL albumin. UOP:  500 mL    DRAINS: Oscar x4. SPECIMENS: left mastectomy scar, left breast capsules, additional left breast lower pole skin.          Krystin Oconnell MD    CC: MD Stefan Lopez MD Kelleen Jabs, MD

## 2023-03-29 NOTE — PROGRESS NOTES
Reason for Admission:  breast cancer  Delayed left breast reconstruction and immediate right breast reconstruction with KELLI                   RUR Score:         5%            Plan for utilizing home health:    no needs identified  @ this time    PCP: First and Last name:  Satnam Serrano MD     Name of Practice:    Are you a current patient: Yes/No: yes   Approximate date of last visit:                        Current Advanced Directive/Advance Care Plan: No Order      Healthcare Decision Maker:              Pt is listed as . Mother is listed as emergency contact on demographics. Transition of Care Plan    Discharge needs: On day of discharge ,EKG needs to be called early @ extension [phone] to fit pt with her event monitor.   Their hours are 8 am to 4 pm.             Twila Banegas

## 2023-03-29 NOTE — DISCHARGE INSTRUCTIONS
Nannette Farmer M.D., F.A.C.S. [facility]  [phone]    MICROSURGICAL BREAST RECONSTRUCTION POST-OPERATIVE INSTRUCTIONS      BRA:  You should not wear a brassiere for two weeks following your procedure. This is to prevent excessive compression on the blood supply to the flap. After the first two weeks, you may wear a loosely-fitting soft bra for the next month. Avoid underwire bras for one month. SURGICAL DRAINS:  Please refer to the TIFFANI Drain Instructions section below for details. ACTIVITY:  Take it easy for the first several days. No cleaning, housework, or strenuous activity. Do not lift anything over 10 pounds, including children. No running, aerobics, or other strenuous activities until four weeks. However, do not remain constantly in bed. You should walk at least three times daily, with assistance if needed. It is normal to feel tight when standing for the first several weeks, so you may want to hunch over slightly when walking. BATHING:  You may shower after you go home. Use a long piece of string or yarn to make a necklace to loop through the TIFFANI drain tabs, so they are not dangling in the shower. NO tub baths, hot tubs, swimming, or any submersion in water for one week after removal of all TIFFANI drains. Skin glue covering your incisions will fall off on its own. If it is still present at two weeks, you may peel or scrub it off. MEDICATION:  Your prescriptions will be sent electronically to your pharmacy through Dr. Juan Ramon Salazar office electronic medical record (EMR) system. You will receive prescriptions for an antibiotic, narcotic pain medication, non-narcotic pain medicine (gabapentin), a stool softener (colace), and full strength aspirin (325 mg).   Take the antibiotic until it is finished, the narcotic pain medication and gabapentin as needed according to the directions, the stool softener while you are on prescription narcotic pain medicine, and the aspirin for two weeks. .  Do not drive while taking narcotic pain medication. If you are no longer taking any of the other pain medication, then you may stop the gabapentin if you wish. You may take non-steroidal anti-inflammatories (e.g. ibuprofen, advil, etc.) instead of, or in addition to, your prescription pain medicine, according to the directions on the bottle. Keep drinking plenty of fluids. If you are unable to have a bowel movement, you may take an over the counter laxative pill or suppository such as Dulcolax. Your prescriptions have been sent to the following pharmacy: [facility] on [address] [facility]. EXERCISE:  You may resume non-strenuous activities as tolerated two weeks after surgery. Normal activity can be instituted one month after surgery, starting slowly, and increasing as your body allows. Weight training, cross-fit, sexual activity, and other vigorous activity should not be started until six weeks following surgery. THINGS TO WATCH FOR:  If the KELLI flap skin turns purple, black, or cold, or if you experience excessive or sudden swelling, spreading or increasing redness, increasing pain, foul-smelling drainage, separation of any incisions, fever, shaking chills, or any other concerns, please call the office immediately ([phone]). FOLLOW-UP APPOINTMENT: Your follow-up appointment with Dr. Kota Florez and/or his medical assistant is scheduled for Wednesday, April 5th, 2023, at 10:00 AM.    APPOINTMENT LOCATION:     [ XX] [facility]    [address] [place] [address], [address] [place] [address], [address]     [     ] [address] [facility]    [facility] [address]    [place], [address] [facility]          TIFFANI Drain Instructions    PURPOSE:  You have had surgery during which a Mayank-Camacho drain, or TIFFANI drain, has been placed by your surgeon.   A TIFFANI drain is a rubber tube which goes under the skin and drains excess fluid during the healing process so that this fluid does not accumulate. There is a one-way valve which allows the fluid to collect in the bulb on the end of the drain. The drain is usually held in place by a single stitch. The color of the drainage can range from reddish to pink to straw-colored. CARE OF THE DRAIN:  Caring for the TIFFANI drain is fairly easy. First, protect the drain so that it does not get pulled inadvertently. You may fasten them to your clothing with a safety pin through the floppy tag on the bulb. DO NOT place a pin through either the drain tubing or the bulb itself. The drain works by maintaining a constant low pressure of suction when the bulb is in the collapsed (squeezed in) position. If the bulb will not maintain this collapsed position when it is recapped, please call the office, as the drain may not be working properly. MEASURING THE DRAINAGE:  Grasp the bulb in one hand and remove the cap with the other hand. This will cause the bulb to relax into a round shape. Holding the open end over a specimen cup, squirt the fluid into the cup by squeezing the bulb. Once the bulb is empty, squeeze it in (collapse it) with one hand, and replace the cap with your other hand. Then holding the measuring cup level, note how much fluid there is (in milliliters, mL), and record this number on the chart below. Discard the fluid in the toilet and rinse out the specimen cup. Note: your specimen cup may be in cubic centimeters (cc). 1 cc = 1 mL. REMOVAL:  The TIFFANI drain will be removed in the office when the daily drainage is at a low enough level. You may be asked to call the office with your measuring totals to determine if the drain(s) is (are) ready to be removed. Removal involves minimal discomfort without the need for anesthesia. The drain site in the skin heals on its own once the drain is removed without any further stitches.     SHOWERING:  Your surgeon may give you permission to shower while you have one or more TIFFANI drains in. Just let the water run over the drain sites and then pat them dry when you are done. Some patients like to place a long string necklace around their necks during showers to which they can safety pin the tag on the bulb to prevent it from dangling. DO NOT take a tub bath, go swimming (pool or lake/ocean), or otherwise submerge your body in water before all TIFFANI drains have been removed and you are permitted by your surgeon.     THINGS TO WATCH FOR:  Please call the office immediately ([phone]) if you notice:  Sudden bright or dark red bleeding into the bulb or around the drain site in the skin  Dislodgment of the TIFFANI drain or if the drain falls out  Spreading redness around the drain site in the skin  Cloudy or foul-smelling drainage in the bulb or around the drain site  Fever, shaking chills, excessive swelling, pain or discomfort  Any other concerns or questions         Date           Right Breast (AM)           Right Breast (PM)           Daily Total   (AM+PM)           Left Breast (AM)           Left Breast (PM)           Daily  Total  (AM+PM)           Right Abdomen (AM)           Right Abdomen (PM)           Daily  Total  (AM+PM)           Left Abdomen (AM)           Left Abdomen (PM)           Daily Total   (AM+PM)

## 2023-03-29 NOTE — PROGRESS NOTES
[facility]                    Cardiology Care Note     [x]Initial Encounter     []Follow-up    Patient Name: Jazmin Nieto - :1965 - WOD:[id number]  Primary Cardiologist: none  Consulting Cardiologist: [employer] [facility]: Mathieu Luna MD     Reason for encounter: PVCs    HPI:       Jazmin Nieto is a 61 y.o. female with PMH significant for breast CA, HTN, NICOLE on CPAP and GERD. Pt presented for breast surgery yesterday, noted to have frequent PVCs with trigeminy and bigeminy at times. She feels palpitations occ when this occurs. She denies any cardiac hx. Recently reports her BP has been higher than normal with stress of her surgery and CA treatment, was placed on 2.5 mg of norvasc. Denies any CP, SOB or edema. Denies any tobacco use, rarely drinks alcohol. She admits to drinking coffee. Subjective:      Jazmin Nieto reports none. Assessment and Plan     PVCs: have decreased in frequency since overnight. Check Mg, K elevated at 5.2. Check TTE as able w/ recent mastectomy/revision surgery. Pt also did not wear CPAP last night, could be playing a role - reports she is trying to be better at wearing this. BP too low for BB therapy. Will d/c with event monitor      2. NICOLE on CPAP: needs to be compliant in wearing     3. HTN: currently BP low, would not resume norvasc     4. Renal insuff: Cr improved since admission     If TTE normal, likely d/c with monitor and office follow up       ____________________________________________________________    Cardiac testing    N/a     Most recent HS troponins:  No results for input(s): TROPHS in the last 72 hours.     No lab exists for component:  CKMB    ECG:  sinus tachycardia w/ non specific T wave abnormality    Review of Systems:    [x]All other systems reviewed and all negative except as written in HPI    [] Patient unable to provide secondary to condition    Past Medical History:   Diagnosis Date    Breast cancer ([address] 2008    LEFT breast cancer    GERD (gastroesophageal reflux disease)     Nausea & vomiting     Sleep apnea      Past Surgical History:   Procedure Laterality Date    HX  SECTION      x2    HX COLONOSCOPY      HX HERNIA REPAIR      umbilical, childhood    HX MASTECTOMY  2008    LEFT     HX MASTECTOMY Right 3/28/2023    RIGHT TOTAL BREAST MASTECTOMY performed by Jennifer Landers MD at [facility] MAIN OR    HX ORTHOPAEDIC Left     ankle fx repair    HX OTHER SURGICAL Left     left implant replaced    HX OTHER SURGICAL      ectopic pregnancy    HX TONSILLECTOMY      HX TUBAL LIGATION  1990    HX WISDOM TEETH EXTRACTION       Social Hx:  reports that she has never smoked. She has never used smokeless tobacco. She reports that she does not currently use alcohol. She reports that she does not use drugs. Family Hx: family history includes High Cholesterol in her mother; Hypertension in her brother and mother. Allergies   Allergen Reactions    Aspirin Swelling     Eye swelling          OBJECTIVE:  Wt Readings from Last 3 Encounters:   23 125.5 kg (276 lb 10.8 oz)   23 126.7 kg (279 lb 4.8 oz)   22 127 kg (280 lb)       Intake/Output Summary (Last 24 hours) at 3/29/2023 0849  Last data filed at 3/29/2023 0600  Gross per 24 hour   Intake 8045 ml   Output 1720 ml   Net 6325 ml       Physical Exam:    Vitals:   Vitals:    23 0405 23 0500 23 0600 23 0700   BP: (!) 104/54 (!) 105/53 (!) 102/58 (!) 100/52   Pulse: 83 82 85 84   Resp: 14 14 13 11   Temp:       SpO2: 100% 100% 100% 100%   Weight:       Height:         Telemetry: normal sinus rhythm w/ occ PVC's    Gen: Well-developed, well-nourished, in no acute distress  Neck: Supple, No JVD, No Carotid Bruit  Resp: No accessory muscle use, Clear breath sounds, No rales or rhonchi  Card: Regular Rate,Rythm, Normal S1, S2, No murmurs, rubs or gallop. No thrills.    Abd: Soft, non-tender, non-distended, BS+   MSK: No cyanosis  Skin: No rashes    Neuro: Moving all four extremities, follows commands appropriately  Psych: Good insight, oriented to person, place, alert, Nml Affect  LE: No edema    Data Review:     Radiology:   XR Results (most recent):  Results from Hospital Encounter encounter on 03/17/23    XR CHEST PA LAT    Narrative  Indication:  preOp    Exam: PA and lateral views of the chest.    Direct comparison is made to prior CXR dated February 2010. Findings: Cardiomediastinal silhouette is within normal limits. Lungs are clear  bilaterally. Pleural spaces are normal. Osseous structures are intact. Impression  No acute cardiopulmonary disease. Recent Labs     03/29/23  0420 03/28/23 2139    139   K 5.2* 4.5   * 108   CO2 23 24   BUN 10 10   CREA 1.06* 1.25*   * 148*   CA 8.0* 8.5     Recent Labs     03/28/23 2139   WBC 11.3*   HGB 11.8   HCT 37.7        No results for input(s): PTP, INR, AP, INREXT in the last 72 hours. No lab exists for component: PTTP, GPT, SGOT  No results for input(s): CHOL, LDLC in the last 72 hours.     No lab exists for component: TGL, HDLC,  HBA1C      Current meds:    Current Facility-Administered Medications:     morphine injection 2 mg, 2 mg, IntraVENous, Q2H PRN, Adam Tobias MD    ibuprofen (MOTRIN) tablet 600 mg, 600 mg, Oral, Q6H, Sukh Kerns MD    Pura Elvinise ON 3/30/2023] ibuprofen (MOTRIN) tablet 600 mg, 600 mg, Oral, Q6H PRN, Adam Tobias MD    [START ON 3/31/2023] acetaminophen (TYLENOL) tablet 650 mg, 650 mg, Oral, Q6H PRN, Adam Tobias MD    acetaminophen (TYLENOL) tablet 1,000 mg, 1,000 mg, Oral, Q6H, Adam Tobias MD, 1,000 mg at 03/29/23 0110    docusate sodium (COLACE) capsule 100 mg, 100 mg, Oral, BID, Adam Tobias MD, 100 mg at 03/29/23 0230    gabapentin (NEURONTIN) capsule 300 mg, 300 mg, Oral, TID, Adam Tobais MD, 300 mg at 03/29/23 0814    heparin (porcine) injection 5,000 Units, 5,000 Units, SubCUTAneous, Q12H, Orestes Kirk MD, 5,000 Units at 03/29/23 8278    HYDROmorphone (DILAUDID) tablet 4 mg, 4 mg, Oral, Q4H PRN, Orestes Kirk MD    HYDROmorphone (DILAUDID) tablet 2 mg, 2 mg, Oral, Q4H PRN, Orestes Kirk MD    ondansetron [facility]) injection 8 mg, 8 mg, IntraVENous, Q8H PRN, Orestes Kirk MD    ceFAZolin (ANCEF) 3 g in 0.9%  ml IVPB, 3 g, IntraVENous, Q8H, Orestes Kirk MD, 3 g at 03/29/23 [address], NP    [facility] Cardiology  Call center: Q) [phone]  (I) [phone]      CC: Jeri Jackson MD

## 2023-03-29 NOTE — PROGRESS NOTES
Cardiac monitor ordered. Unit is charging. Please call EKG as early as possible on day of discharge for monitor placement. Discharge order does NOT need to be in the system for placement. Allow 1 hour lead time.  Ext [phone] Monday through Friday 8 - 4PM.

## 2023-03-29 NOTE — PROGRESS NOTES
2245  TRANSFER - IN REPORT:    Verbal report received from Luisana(name) on Carol Ann Abdullahi  being received from [employer]) for routine post - op      Report consisted of patients Situation, Background, Assessment and   Recommendations(SBAR). Information from the following report(s) SBAR, Kardex, OR Summary, Procedure Summary, Intake/Output, MAR, Med Rec Status, Cardiac Rhythm 1st degree AV block, and Quality Measures was reviewed with the receiving nurse. Opportunity for questions and clarification was provided. Assessment completed upon patients arrival to unit and care assumed. 2300  Pt arrived to ICU.     2305  Initial assessment complete-see flowsheets. 2343  Dr. Judy Chung paged at this time for an order for CPAP. Pt wears CPAP at home and does not have her machine. 2347  Received verbal telephone order from Dr. Edna Gauthier for CPAP at this time. 0000  Reassessment complete-see flowsheets. 0400  Reassessment complete-see flowsheets. See flowsheets for all hourly KELLI assessments. 0700  Bedside and Verbal shift change report given to Santy (oncoming nurse) by Mally Lopes (offgoing nurse). Report included the following information SBAR, Kardex, Intake/Output, MAR, Med Rec Status, Cardiac Rhythm SR/1st degree AV block/PVCs, and Quality Measures.

## 2023-03-29 NOTE — OP NOTES
Name: Betito Holden  Surgeon: Dandy Bryant MD   Account #: [de-identified]   Surgery Date: 3/28/2023  : 1965  Age: 61 y.o. Location: [facility]    OPERATIVE REPORT     PREOPERATIVE DIAGNOSES:   1. Left breast cancer. 2.  Acquired absence of bilateral breasts. POSTOPERATIVE DIAGNOSES:   1. Left breast cancer. 2.  Acquired absence of bilateral breasts. OPERATIVE PROCEDURE:   1. Excision of left mastectomy scars, 15 cm each (Dr. Mikaela Rivera). 2. Immediate right breast reconstruction with KELLI (deep inferior epigastric artery ) flap (both as Co-Surgeons). 3.  Removal of left breast intact implant (Dr. Mikaela Rivera). 4.  Left capsulectomy (Dr. Mikaela Rivera). 5.  Left pectoralis major muscle flap (Dr. Mikaela Rivera). 6.  Bilateral extrapleural rib resection (Dr. Nirmal Reeves on the right, Dr. Mikaela Rivera on the left). 7.  Intraoperative bilateral TAP (transversus abdominis plane) regional block (injection) with ultrasound guidance (Dr. Nirmal Reeves on the right, Dr. Mikaela Rivera on the left). SURGEON:  Dandy Bryant MD.    Nidia Andrews MD.     ANESTHESIA: General endotracheal.     INDICATIONS: The patient is a 61 y.o. female who was diagnosed with a breast cancer on the left side. She previously underwent left mastectomy with subpectoral implant reconstruction and had elected to undergo right prophylactic mastectomy today with bilateral KELLI flaps. She was an excellent candidate for immediate breast reconstruction. The pros and cons of both tissue expander/ implant-based reconstruction as well as autologous tissue reconstruction were discussed at length. The decision was made to proceed with the latter. Bilateral microsurgical  flap reconstruction was recommended. The procedure, as well as the alternatives, possible complications, and anticipated scars were outlined with the patient and she agrees to proceed having given her informed and written consent.  She understands the risks associated with adjuvant radiotherapy, if it becomes necessary. CT angiography was performed pre-operatively to map out the courses of the perforating blood vessels to the flap tissue of the lower abdomen and to guide intraoperative dissection. PROCEDURE IN DETAIL: Please note that Alicia Morales and Zandra Andrews are both co-surgeons for this operation due to the length and complexity of the microsurgical case. Co-surgeons are required for the operation because there is no other microsurgically-trained assistant, fellow, or resident otherwise available. The complexity of the  flaps involves dissection of the perforating blood vessels through the fibers of the rectus abdominis muscle during flap harvest, the extra time required for this (at least 50% longer), and the significantly greater technical difficulty than a standard free TRAM flap. The -22 modifier is also used due to this increased complexity and time requirement as well. Dr. Khang Schreiber was the surgeon assigned preparation of the right chest, including extrapleural rib resection on that side, and harvest of the right hemiabdominal KELLI flap. Dr. Zandra Andrews was the surgeon assigned preparation of the left chest, including extrapleural rib resection on that side, and harvest of the left hemiabdominal KELLI flap. Both worked independently and concomitantly during these portions of the case and are considered the surgeon for their respective side. They then assisted each other under the microscope during the microsurgical portion of the procedure, then closed their respective sides. The patient was positively identified in the pre-operative holding area, and she was marked in the upright position. She received pre-operative intravenous antibiotics. She was taken to the operating room and placed in the supine position with all pressure points padded. Pneumatic compression stockings were applied to bilateral lower extremities.   Following successful general endotracheal anesthesia, a castillo catheter was placed. The arms were secured to padded arm boards. The chest and abdomen were prepped and draped in the usual sterile fashion. At this point she underwent right skin-sparing mastectomy by Dr. Magaly Pace. This will be dictated separately by him. At the conclusion of his portion of the procedure, Alicia Morales and Gerrie Boas presented to the operating room and scrubbed in. Dr. Moustapha Mccord began in the right chest with preparation of the recipient vessels, specifically the internal mammary vessels. The breast pocket was irrigated and hemostasis assured. The inframammary fold was recreated with #3-0 vicryl sutures. A segment of muscle was removed over the third rib. The third costal cartilage was resected in extrapleural fashion to expose the internal mammary vessels. Under loupe magnification, they were dissected circumferentially, dividing branches between hemostatic clips. The vessels were bathed in papaverine and covered with saline-moistened gauze until ready for use. At the same time, Dr. Gerrie Boas harvested the left hemiabdominal KELLI flap. The upper incision was made with a scalpel. Dissection continued with the Bovie down to the abdominal wall fascia. Next, dissection continued in a cephalad direction to elevate the upper abdominal flap to facilitate closure. The midline incision was made with a scalpel and completed with the bovie down to fascia. The umbilicus was circumscribed. The lower abdominal incision was then made with a scalpel. Under loupe magnification, the superficial inferior epigastric vein was identified and dissected inferiorly. It was divided between clips and preserved for possible later use for additional venous drainage for the flap. Dissection in the lower incision continued down to the abdominal wall with the Bovie.   Then, starting laterally, the KELLI flap was elevated on the left side, based on two lateral row perforators with palpable pulse. Next, the anterior fascia was split and reflected to expose the rectus abdominis muscle. Please note there was extensive scar tissue in the inferior abdomen near the area of her previous pfannensteil incision. This further complicated the dissection. The course of the perforators was traced through the fibers of the rectus abdominis, to the source vessel on the posterior aspect of the muscle, dividing branches between hemostatic clips. The vascular pedicle was then dissected down into the pelvis at its origin off the iliac vessels. The pedicle vessels were bathed in papaverine, and the flap was temporarily secured in position using skin noah. Next, Dr. Ki Mejia moved to preparation of the left chest and Dr. Marina Macias to harvest of the right hemiabdominal KELLI flap. In the right hemiabdomen, a two medial row  KELLI flap was elevated, dissecting the perforating blood vessels through the rectus abdominis muscle fibers, dividing branches between hemostatic clips. The perforators dove down medially and traveled below the rectus abdominis, encased in her caesarean scar tissue. This further complicated the dissection. The vessels of the vascular pedicle of the flap were dissected circumferentially at their origins off the iliac vessels and bathed in papaverine. The flap was stapled into position until ready for use. In the left chest, the 15 cm mastectomy scar was excised with a scalpel. Dissection continued with the bovie and the capsule was entered. A largely intact breast implant was removed. The inferior portion of the breast capsule was removed with the bovie and sent for permanent section. Next, the pectoralis major muscle was elevated in flap fashion, based on the thoracoacromial vessels which were identified and preserved, and  it from the overlying skin and subcutaneous tissues, to the extent of the pre-operative markings.   The breast pocket was irrigated and hemostasis assured. The remaining capsule was scored with the bovie to promote adhesion. The pectoralis muscle flap was then returned to its anatomic position on the chest wall and secured with vicryl sutures. A segment of muscle was removed over the fourth rib. The fourth costal cartilage was resected in extrapleural fashion to expose the internal mammary vessels. Under loupe magnification, they were dissected circumferentially, dividing branches between hemostatic clips. The vessels were bathed in papaverine and covered with saline-moistened gauze until ready for use. The patient received 5000 units subcutaneous heparin. Next, the arms were tucked to the patient's sides with appropriate padding. The patient received 5000 units subcutaneous heparin. The left hemiabdominal KELLI flap was harvested by ligating the origin of the vessels with hemostatic clips. It was brought to the right chest and placed in the pocket. The skin island was marked as a [place]. The remainder was de-epithelialized with scissors. The flap was positioned and the operating microscope brought in. The flap vessels were prepared under the microscope and irrigated with heparin saline solution. The venous anastomosis was performed first. The internal mammary vein was clipped inferiorly and clamped superiorly. It was divided, prepared, and irrigated. Next an end-to-end anastomosis was performed with a 3 mm venous  in antegrade fashion. The clamp was taken down and excellent backfill noted. Next the arterial anastomosis was performed. The internal mammary artery was clipped inferiorly and clamped superiorly. It was divided, prepared, and irrigated. Then the anastomosis was performed with interrupted #9-0 nylon sutures in hand sewn fashion. The clamps were taken down and excellent perfusion of the flap noted.   Exparel had been expanded with 40 mL injectable saline and 40 mL 0.25% plain marcaine to 100 mL total.  Twenty mL was infiltrated in the muscular and deep subcutaneous tissues of the breast pocket. A 19 Fr. Ladena Closs drain was placed in the breast pocket, brought out through a #15 blade stab incision in the lateral inframammary fold and secured with a #2-0 nylon suture. The flap was anchored to the chest wall superomedially with a #2-0 vicryl suture. The flap was stapled in the breast pocket. Next the right hemiabdominal KELLI flap was harvested by ligating the origin of the vessels with hemostatic clips. It was brought to the left chest and placed in the pocket. The skin island was marked as an oval.  The remainder was de-epithelialized with scissors. The flap was positioned, the vessels were prepared under the microscope, and they were irrigated with heparin saline solution. The venous anastomosis was performed first.  The internal mammary vein was clipped inferiorly and clamped superiorly. It was divided, prepared, and irrigated. Next an end-to-end anastomosis was performed with a 3 mm venous  in antegrade fashion. The clamp was taken down and excellent backfill noted. Next the arterial anastomosis was performed. The internal mammary artery was clipped inferiorly and clamped superiorly. It was divided, prepared, and irrigated. Then the anastomosis was performed with interrupted #9-0 nylon sutures in hand sewn fashion. The clamps were taken down and excellent perfusion of the flap noted. Twenty mL of exparel was infiltrated in the muscular and deep subcutaneous tissues of the breast pocket. A 19 Fr. Ladena Closs drain was placed in the breast pocket, brought out through a #15 blade stab incision in the lateral inframammary fold and secured with a #2-0 nylon suture. The flap was anchored to the chest wall superomedially with a #2-0 vicryl suture. The flap was stapled in the breast pocket.     Both KELLI flaps were then inset with buried #3-0 monocryl sutures, followed by #2-0/3-0 monoderm barbed suture in running subcuticular fashion. The abdomen was then closed after placing the patient in semi-Stahl position. No fascia nor muscle had been removed from the abdomen. The anterior rectus sheath was closed primarily on each side with #2-0 barbed PDS suture in double-layer, running whipstitch fashion. No mesh was used. Two 19 Fr Oscar drains were placed in the abdomen and brought out through separate inferolateral #15 blade stab incisions and secured with #2-0 nylon sutures. Next, bilateral transversus abdominis plane (TAP) regional blocks were performed through the open wound and exposed abdominal wall. With ultrasound guidance, the plane between the internal oblique and transversus abdominis muscles was identified on each side, and 20 mL of the Exparel mixture was injected on each respective side with a hypodermic needle. The remainder of the Exparel was injected directly into both rectus abdominis muscles. Jina's fascia was then closed with interrupted #2-0 vicryl sutures. Skin closure was completed with #2-0/3-0 monoderm barbed suture in running subcuticular fashion. A core of skin and adipose tissue was removed with a scalpel and bovie so as to accept the umbilicus. The umbilicus was inset with interrupted buried dermal #4-0 monocryl sutures. All of the wounds were cleaned. Drains were hooked to bulb suction. Skin glue was applied to the incisions. Vioptix probes were placed on each flap skin island and appropriate readings noted. They were secured with tegaderm dressings. Instrument, sponge, and needle counts were reported to be correct. The patient was allowed to awaken from anesthesia. She was extubated without apparent complication. She was transferred to the bed in semi-Stahl position and was then taken to the recovery room in excellent condition. COMPLICATIONS: episodes of bigeminy and trigeminy; however, patient remained hemodynamically stable throughout.  Also noted to have lower than expected urine output. EBL: 150 mL     IV Fluid: 8500 mL crystalloid, 500 mL albumin. UOP:  500 mL. DRAINS: Oscar x4. SPECIMENS: Left breast mastectomy scar, left breast capsule, right breast additional skin, left breast additional skin.        Vale Galaviz MD

## 2023-03-29 NOTE — PERIOP NOTES
TRANSFER - OUT REPORT:    Verbal report given to [address] RN (name) on Israel Culver  being transferred to ICU 8(unit) for routine progression of care       Report consisted of patients Situation, Background, Assessment and   Recommendations(SBAR). Information from the following report(s) SBAR, Kardex, Procedure Summary, Intake/Output, MAR, Recent Results, and Cardiac Rhythm NSR w PVC's  was reviewed with the receiving nurse. Lines:   Peripheral IV 03/28/23 Posterior;Right Hand (Active)   Site Assessment Clean, dry, & intact 03/28/23 2200   Phlebitis Assessment 0 03/28/23 2200   Infiltration Assessment 0 03/28/23 2200   Dressing Status Clean, dry, & intact 03/28/23 2200   Dressing Type Transparent 03/28/23 2200   Hub Color/Line Status Pink 03/28/23 2200   Action Taken Open ports on tubing capped 03/28/23 2200   Alcohol Cap Used Yes 03/28/23 2200        Opportunity for questions and clarification was provided.       Patient transported with:   Monitor  O2 @ 2 liters  Registered Nurse

## 2023-03-29 NOTE — BRIEF OP NOTE
Brief Postoperative Note    Patient: Tolbert Dubin  YOB: 1965  MRN: [id number]    Date of Procedure: 3/28/2023     Pre-Op Diagnosis: BREAST CANCER    Post-Op Diagnosis: Same as preoperative diagnosis. Procedure(s):  REMOVAL LEFT BREAST IMPLANT/PECTORALIS MUSCLE FLAP/BILATERAL BREAST RECONSTRUCTION WITH KELLI MICROSURGICAL FREE FLAPS, RIGHT TOTAL BREAST MASTECTOMY  RIGHT TOTAL BREAST MASTECTOMY    Surgeon(s):  Annabel Seip., MD Ling Stapleton, MD Marvin Jacobson, MD Marvin Marie MD    Surgical Assistant: Surg Asst-1: Vane BROWN    Anesthesia: General     Estimated Blood Loss (mL): 131 mL    Complications: intermittent bigeminy and trigeminy with no hemodynamic instability    Specimens:   ID Type Source Tests Collected by Time Destination   1 : Right Breast Mastectomy Preservative Breast  Ling Stapleton MD 3/28/2023 8890 Pathology   2 : LEFT mastectomy scar Preservative Breast  Ling Stapleton MD 3/28/2023 1150 Pathology   3 : Left Breast Capsule Preservative Breast  Ling Stapleton MD 3/28/2023 1222 Pathology   4 : left breast lower pole skin Preservative Breast  Ling Stapleton MD 3/28/2023 1830 Pathology        Implants:   Implant Name Type Inv.  Item Serial No.  Lot No. LRB No. Used Action    MICORVASC GEM 3.0MM -- BX/6 - SNA   MICORVASC GEM 3.0MM -- BX/6 NA RF [employer] [id number] Right 1 Implanted    MICORVASC GEM 3.0MM -- BX/6 - SNA   MICORVASC GEM 3.0MM -- BX/6 NA [employer] [id number] Left 1 Implanted       Drains:   Mayank-Camacho Drain 03/28/23 Right Abdomen (Active)       Mayank-Camacho Drain 03/28/23 Left Abdomen (Active)       Mayank-Camacho Drain 03/28/23 Right Breast (Active)       Mayank-Camacho Drain 03/28/23 Left Breast (Active)       Findings: appropriate vioptix readings at end of case    Electronically Signed by Vilma Esteban MD on 3/28/2023 at 8:58 PM

## 2023-03-29 NOTE — PROGRESS NOTES
Problem: Falls - Risk of  Goal: *Absence of Falls  Description: Document Yakelin Vega Fall Risk and appropriate interventions in the flowsheet.   Outcome: Progressing Towards Goal  Note: Fall Risk Interventions:                                Problem: Patient Education: Go to Patient Education Activity  Goal: Patient/Family Education  Outcome: Progressing Towards Goal

## 2023-03-29 NOTE — PERIOP NOTES
Labs and EKG done per orders. Dr. Chika Kemp in updated on pt status and vioptix #'s. No further orders.

## 2023-03-29 NOTE — ANESTHESIA POSTPROCEDURE EVALUATION
Procedure(s):  REMOVAL LEFT BREAST IMPLANT/PECTORALIS MUSCLE FLAP/BILATERAL BREAST RECONSTRUCTION WITH KELLI MICROSURGICAL FREE FLAPS, RIGHT TOTAL BREAST MASTECTOMY  RIGHT TOTAL BREAST MASTECTOMY. general    Anesthesia Post Evaluation      Multimodal analgesia: multimodal analgesia used between 6 hours prior to anesthesia start to PACU discharge  Patient location during evaluation: PACU  Patient participation: complete - patient participated  Level of consciousness: awake  Pain management: satisfactory to patient  Airway patency: patent  Anesthetic complications: no  Cardiovascular status: acceptable  Respiratory status: acceptable  Hydration status: acceptable  Post anesthesia nausea and vomiting:  controlled  Final Post Anesthesia Temperature Assessment:  Normothermia (36.0-37.5 degrees C)      INITIAL Post-op Vital signs:   Vitals Value Taken Time   /94 03/28/23 2241   Temp 37.1 °C (98.7 °F) 03/28/23 2129   Pulse 91 03/28/23 2245   Resp 12 03/28/23 2245   SpO2 100 % 03/28/23 2245   Vitals shown include unvalidated device data.

## 2023-03-29 NOTE — PROGRESS NOTES
POD#1 s/p right skin sparing mastectomy, removal of left breast implant, and bilateral KELLI flap breast reconstruction  No c/o  AVSS  Drains serosang  Vioptix: StO2 right breast = 68%, left = 67%, both pretty steady trends  Awake and alert  Breasts: flaps warm and soft; incisions CDI; no hematoma  Abd: soft, ND; inc CDI; umbo intact  Labs: K=5.2 this AM, will re-check later; Cr = 1.06 this AM, down from 1.26 immed post op  Hb = 11.8; PLTs OK  EKG in PACU showed increased CT interval  Imp: stable surgically  Cont Q1h flap checks today; D/C castillo and IV fluids  Will add ibuprofen to pain regimen since Cr normalized  DVT proph; OOB to chair; incent spir  Consult to cardiology today since patient had intermittent bigeminy and trigeminy yesterday throughout the lengthy operation, although no hemodynamic instability at any point

## 2023-03-30 PROCEDURE — 94660 CPAP INITIATION&MGMT: CPT

## 2023-03-30 PROCEDURE — 74011250637 HC RX REV CODE- 250/637: Performed by: PLASTIC SURGERY

## 2023-03-30 PROCEDURE — 65270000029 HC RM PRIVATE

## 2023-03-30 PROCEDURE — 74011250636 HC RX REV CODE- 250/636: Performed by: PLASTIC SURGERY

## 2023-03-30 RX ADMIN — DOCUSATE SODIUM 100 MG: 100 CAPSULE, LIQUID FILLED ORAL at 09:15

## 2023-03-30 RX ADMIN — ACETAMINOPHEN 1000 MG: 500 TABLET ORAL at 15:06

## 2023-03-30 RX ADMIN — ACETAMINOPHEN 1000 MG: 500 TABLET ORAL at 09:15

## 2023-03-30 RX ADMIN — HEPARIN SODIUM 5000 UNITS: 5000 INJECTION INTRAVENOUS; SUBCUTANEOUS at 21:13

## 2023-03-30 RX ADMIN — GABAPENTIN 300 MG: 300 CAPSULE ORAL at 15:06

## 2023-03-30 RX ADMIN — GABAPENTIN 300 MG: 300 CAPSULE ORAL at 09:15

## 2023-03-30 RX ADMIN — ACETAMINOPHEN 1000 MG: 500 TABLET ORAL at 04:39

## 2023-03-30 RX ADMIN — GABAPENTIN 300 MG: 300 CAPSULE ORAL at 21:13

## 2023-03-30 RX ADMIN — DOCUSATE SODIUM 100 MG: 100 CAPSULE, LIQUID FILLED ORAL at 17:45

## 2023-03-30 RX ADMIN — HEPARIN SODIUM 5000 UNITS: 5000 INJECTION INTRAVENOUS; SUBCUTANEOUS at 09:16

## 2023-03-30 NOTE — PROGRESS NOTES
Spiritual Care Assessment/Progress Note  [address]      NAME: Tyson Saldana      MRN: [id number]  AGE: 61 y.o. SEX: female  Episcopalian Affiliation: No preference   Language: English     3/30/2023     Total Time (in minutes): 10     Spiritual Assessment begun in [facility] INTENSIVE CARE through conversation with:         []Patient        [] Family    [] Friend(s)        Reason for Consult: Initial/Spiritual assessment, critical care     Spiritual beliefs: (Please include comment if needed)     [] Identifies with a tootie tradition:         [] Supported by a tootie community:            [] Claims no spiritual orientation:           [] Seeking spiritual identity:                [] Adheres to an individual form of spirituality:           [x] Not able to assess:                           Identified resources for coping:      [] Prayer                               [] Music                  [] Guided Imagery     [] Family/friends                 [] Pet visits     [] Devotional reading                         [x] Unknown     [] Other:                                               Interventions offered during this visit: (See comments for more details)                Plan of Care:     [] Support spiritual and/or cultural needs    [] Support AMD and/or advance care planning process      [] Support grieving process   [] Coordinate Rites and/or Rituals    [] Coordination with community clergy   [] No spiritual needs identified at this time   [] Detailed Plan of Care below (See Comments)  [] Make referral to Music Therapy  [] Make referral to Pet Therapy     [] Make referral to Addiction services  [] Make referral to [facility]  [] Make referral to Spiritual Care Partner  [] No future visits requested        [x] Contact Spiritual Care for further referrals     Comments:  visit for the patient in ICU. Reviewed patient's chart and spoke with patient's nurse.  Pt appeared to be resting well upon arrival. Chart notes indicate no Jehovah's witness preference. Offered words of good intent, peace, comfort and healing. Please contact Spiritual Care for further referrals.     Fernando. [address], Osman Hathaway 87, Shady [address], [facility]  Staff   Paging service: [phone] (PRALACEY)

## 2023-03-30 NOTE — PROGRESS NOTES
Problem: Falls - Risk of  Goal: *Absence of Falls  Description: Document Mary Leonard Fall Risk and appropriate interventions in the flowsheet.   Outcome: Progressing Towards Goal  Note: Fall Risk Interventions:                                Problem: Patient Education: Go to Patient Education Activity  Goal: Patient/Family Education  Outcome: Progressing Towards Goal

## 2023-03-30 NOTE — PROGRESS NOTES
Cardiology Update:     TTE w/ nl LV function, event monitor ordered. Pt may d/c from cardiac standpoint, OP follow up arranged.      Follow-up Information       Follow up With Specialties Details Why Contact Info    Hilaria Albright MD Plastic Surgery Follow up on 4/5/2023  [address] [facility]      Prem Turnre MD Surgery Physician, Surgery General Follow up  [address] [address] [phone]      Miley Willis, [address] [facility] Nurse Practitioner, Cardiovascular Disease Physician Follow up on 4/20/2023 9:00 am [address] [place] [address]  [phone]

## 2023-03-30 NOTE — PROGRESS NOTES
Today:  I discussed pt with pt.'s nurse. Pt will be transferred to the surgical floor. There are no identified home health,DME or rehab needs @ this time. On day of discharge ,EKG needs to be called EARLY @ extension [phone] to fit pt with her event monitor.   Their hours are 8 am to 4 pm.              Sky Rush

## 2023-03-30 NOTE — PROGRESS NOTES
Plastic surgery Progress Note    POD 2 s/p R SSM, L implant removal, b/l KELLI flap    No issues this AM. Underwent TE yesterday, normal    Patient Vitals for the past 24 hrs:   Temp Pulse Resp BP SpO2   03/30/23 0730 -- 88 10 -- 96 %   03/30/23 0715 -- 90 (!) 0 -- 96 %   03/30/23 0700 -- 92 19 107/65 95 %   03/30/23 0645 -- 85 17 -- 95 %   03/30/23 0630 -- 87 17 -- 96 %   03/30/23 0615 -- 86 18 -- 96 %   03/30/23 0600 -- 84 17 102/62 95 %   03/30/23 0500 -- 84 17 113/62 95 %   03/30/23 0400 -- 78 11 117/63 100 %   03/30/23 0300 98.4 °F (36.9 °C) 82 14 (!) 104/55 100 %   03/30/23 0200 -- 85 12 (!) 112/54 100 %   03/30/23 0100 -- 84 16 (!) 100/53 99 %   03/30/23 0000 -- 85 15 107/62 100 %   03/29/23 2300 98.2 °F (36.8 °C) 82 18 109/61 100 %   03/29/23 2216 -- -- -- -- 100 %   03/29/23 2200 -- 93 19 112/68 98 %   03/29/23 2100 -- 89 19 (!) 100/51 --   03/29/23 2000 -- 89 12 -- 97 %   03/29/23 1900 98.5 °F (36.9 °C) 95 17 (!) 110/57 97 %   03/29/23 1800 -- 85 16 (!) 103/52 95 %   03/29/23 1700 -- 87 17 (!) 102/51 96 %   03/29/23 1600 97.9 °F (36.6 °C) 86 15 134/68 96 %   03/29/23 1500 -- 88 16 135/70 97 %   03/29/23 1434 -- -- -- (!) 122/54 --   03/29/23 1415 -- 84 15 (!) 122/54 97 %   03/29/23 1300 -- 89 21 (!) 96/58 98 %   03/29/23 1210 98.9 °F (37.2 °C) 91 20 (!) 100/59 97 %   03/29/23 1100 -- 83 15 103/63 99 %   03/29/23 1000 -- 91 16 (!) 83/53 96 %   03/29/23 0900 -- 89 17 (!) 106/56 98 %     Drains serosang  Vioptix: StO2 right breast = 67%, left = 66%, both pretty steady trends  Awake and alert  Breasts: flaps warm and soft; incisions CDI; no hematoma  Abd: soft, ND; inc CDI; umbo intact    POD 2 s/p b/l KELLI flap  Q4hr flap heck  Drain management  Remove vioptix today  Reg diet  Ambulate  Cardio c/s: fu outpatient  DVT PPX SQH, IS  Transfer to floor (will continue cardiac monitoring)

## 2023-03-30 NOTE — PROGRESS NOTES
0700- Bedside and Verbal shift change report given to [address] (oncoming nurse) by Marlene Ovalle RN (offgoing nurse). Report included the following information SBAR, Kardex, ED Summary, Procedure Summary, Intake/Output, MAR, Recent Results, Cardiac Rhythm NSR, and Alarm Parameters   Primary Nurse Aurleio Ibrahim and Anish Ritchie, RN performed a dual skin assessment on this patient Impairment noted- see wound doc flow sheet  Sj score is 19  Received patient AAOx4, able to make needs known to staff clearly. Respirations even easy unlabored. No SOB or cough noted. B/L Breasts C/D/I with Vioptx in place, continuing q1 checks until discontinued by Dr. Linda Jewell. X2 TIFFANI noted C/D/I. Abdomen soft tender to incision sites, non-distended, +BS noted x4 quadrants. X2 TIFFANI drains noted. Patient has been Call bell in reach, bed locked in lowest position. 0750- Set up with breakfast- able to feed self.  0800- OOB to commode- and then to recliner. Changed gown and bedpad. Dr. Candace Zepeda at bedside. 0820- Plan to remove Vioptx and transfer to Surgical when bed is available. Keith Allen NP from Cardiology- plan for halter monitor to be placed upon discharge tomorrow and follow up outpatient. 0900- Patient has remained OOB in recliner, tolerated breakfast well. Dianne Zepeda wrote in her note to start regular diet, placed order to start for lunch time. 1000- Remains OOB in recliner, urinated well. 1045- Assisted to commode. Removed off cardiac monitor as patient is now surgical downgrade. 1100- Back to recliner- patient wants to rest now. Call bell in reach. 1200- Eating lunch, no changes noted to assessment. B/L Breasts C/D/I- warm to touch. 1235- Ate all of lunch, provided with menu to call dinner for tonight. 1300- No changes noted at this time. 1400- Remains OOB in recliner, no changes noted. 1500- B/L Breasts C/D/I, no changes noted. 1600- Patient able to get self to commode, call bell in reach. B/L Breasts and Abdomen unchanged. [address] has been delivered, patient eating without difficult. 1800- No changes noted. 1900- Bedside and Verbal shift change report given to Jaz Delarosa (oncoming nurse) by Ar worthington (offgoing nurse). Report included the following information SBAR, Kardex, ED Summary, Procedure Summary, Intake/Output, MAR, Recent Results, Cardiac Rhythm NSR, and Alarm Parameters .

## 2023-03-31 VITALS
RESPIRATION RATE: 18 BRPM | SYSTOLIC BLOOD PRESSURE: 127 MMHG | OXYGEN SATURATION: 99 % | HEIGHT: 67 IN | BODY MASS INDEX: 44.33 KG/M2 | DIASTOLIC BLOOD PRESSURE: 62 MMHG | WEIGHT: 282.41 LBS | TEMPERATURE: 98.4 F | HEART RATE: 95 BPM

## 2023-03-31 PROCEDURE — 94761 N-INVAS EAR/PLS OXIMETRY MLT: CPT

## 2023-03-31 PROCEDURE — 94660 CPAP INITIATION&MGMT: CPT

## 2023-03-31 PROCEDURE — 74011250636 HC RX REV CODE- 250/636: Performed by: PLASTIC SURGERY

## 2023-03-31 PROCEDURE — 74011250637 HC RX REV CODE- 250/637: Performed by: PLASTIC SURGERY

## 2023-03-31 RX ADMIN — GABAPENTIN 300 MG: 300 CAPSULE ORAL at 08:16

## 2023-03-31 RX ADMIN — HYDROMORPHONE HYDROCHLORIDE 2 MG: 2 TABLET ORAL at 04:54

## 2023-03-31 RX ADMIN — DOCUSATE SODIUM 100 MG: 100 CAPSULE, LIQUID FILLED ORAL at 08:16

## 2023-03-31 RX ADMIN — HEPARIN SODIUM 5000 UNITS: 5000 INJECTION INTRAVENOUS; SUBCUTANEOUS at 08:16

## 2023-03-31 RX ADMIN — ACETAMINOPHEN 650 MG: 325 TABLET ORAL at 00:26

## 2023-03-31 NOTE — PROGRESS NOTES
Discharge note:    RUR 6%  LOS 3 days,GLOS 2.4    POD #3 s/p bilateral KELLI flaps  Pt is discharging home today. Family will transport pt home. At 8 am,I will contact EKG @ [phone] so they can fit pt with an event monitor. Per surgeon's note,pt will follow-up with surgeon on Wednesday for  removal of her drains. Scripts electronically sent to pt.'s pharmacy on [facility]. There are no home health,rehab or DME needs identified.   I will update nurse once I contact EKG:regarding event monitor     Urmila Mccord

## 2023-03-31 NOTE — PROGRESS NOTES
POD#3 s/p bilat KELLI flaps  Pt still in ICU because apparently no beds available on med/surg floor  No c/o  AVSS  Drains serosang  Awake and alert  Breasts: flaps warm and soft; incisions CDI; no hematoma  Abd: soft, ND; inc CDI; umbo viable  Stable, looks great  D/C home today  Rxs for dilaudid, cefadroxil, gabapentin, and colace sent to [facility] on [address] [facility] through my office EMR  F/up on Weds for drain remova  F/up with cards and Dr. Angel Epperson

## 2023-03-31 NOTE — PROGRESS NOTES
0700 Bedside and Verbal shift change report given to Conchetta Bosworth, RN (oncoming nurse) by Frank Mac RN (offgoing nurse). Report included the following information SBAR, OR Summary, Intake/Output, MAR, Recent Results, Med Rec Status, and Cardiac Rhythm NSR . Primary Nurse Shyanne Orozco RN and Frank Mac RN performed a dual skin assessment on this patient No impairment noted  Sj score is see flowsheet. 8767 Morning meds given. Patient assessed, see flowsheet. 0900 Event monitor employee at bedside to fit patient for at home monitor. 1030 Discharge paperwork gone over with patient. Patient and nurse signed documents and placed copy in the chart. IV's removed and drains emptied. Patient belongings packed up. Patient getting dressed to leave. 1050 I have reviewed discharge instructions with the patient and spouse. The patient and spouse verbalized understanding. Patient transported to 's car by wheelchair, helped patient into bed.

## 2023-03-31 NOTE — PROGRESS NOTES
Problem: Falls - Risk of  Goal: *Absence of Falls  Description: Document Yi Valdovinos Fall Risk and appropriate interventions in the flowsheet.   Outcome: Progressing Towards Goal  Note: Fall Risk Interventions:

## 2023-03-31 NOTE — PROGRESS NOTES
1900-Bedside and Verbal shift change report given to Tahmina Farias RN (oncoming nurse) by Navi Babin RN (offgoing nurse). Report included the following information SBAR, Kardex, ED Summary, OR Summary, Procedure Summary, Intake/Output, MAR, Recent Results, Med Rec Status, Cardiac Rhythm NSR/First Degree AV Block, Alarm Parameters , and Quality Measures. 2000-Shift Assessment, LDAs, & I+Os completed-see flowsheets. KELLI Flap assessment completed-see flowsheet. 0000-Reassessment, LDAs, & I+Os completed-see flowsheets. KELLI Flap assessment completed-see flowsheet. 0400-Reassessment, LDAs, & I+Os completed-see flowsheets. KELLI Flap assessment completed-see flowsheet. 0700-Bedside and Verbal shift change report given to Lisha Guevara (oncoming nurse) by Tahmina Farias RN (offgoing nurse). Report included the following information SBAR, Kardex, ED Summary, OR Summary, Procedure Summary, Intake/Output, MAR, Recent Results, Med Rec Status, Cardiac Rhythm NSR/First Degree AV Block, Alarm Parameters , and Quality Measures.      Rico Todd RN

## 2023-03-31 NOTE — DISCHARGE SUMMARY
[address] SUMMARY    Name:  Best Salamanca  MR#:  [id number]  :  1965  ACCOUNT #:  [de-identified]  ADMIT DATE:  2023  DISCHARGE DATE:  2023    PRINCIPAL DIAGNOSIS:  History of left breast cancer. SECONDARY DIAGNOSIS:  Acquired absence of bilateral breasts. PROCEDURES:  On 2023, the patient underwent right total mastectomy, left implant removal, and bilateral breast reconstruction with KELLI microsurgical free flaps by Dr. Roger Son, Dr. Juan Luis Richardson, and Dr. Romi Phan:  The patient is a 59-year-old female who was admitted for procedure above. Surgery was lengthy but no surgical complications. She was noted, however, to have intermittent runs of bigeminy and trigemini with no hemodynamic instability during surgery. She was monitored per usual in terms of flap care postoperatively with no flap specific complications. She was seen by Cardiology. TTE showed normal LV function and she was arranged for outpatient monitor and Cardiology followup. No further interventions were required from that perspective. She was discharged home on day 3 in excellent condition, tolerating regular diet. Follow up is with Dr. Mikaela Rivera in one week. Follow up with Dr. Anmol Reilly, and follow up with cardiology nurse practitioner. DISCHARGE DIET:  Regular. SPECIAL INSTRUCTIONS:  TIFFANI drain care. The patient may shower. DISCHARGE MEDICATIONS:  She will resume Advil p.r.n., amlodipine 2.5 mg daily, and meloxicam.  Prescriptions were sent to her pharmacy for Dilaudid 2 mg p.o. q. 4 hours p.r.n. pain, gabapentin 300 mg p.o. q. 8 hours p.r.n. pain, cefadroxil 500 mg p.o. b.i.d. x1 week, and Colace 100 mg p.o. b.i.d.    DISPOSITION:  Home. CONDITION:  Stable.       MD SHASHANK Santiago/[email or web address]_I/[email or web address]_P  D:  2023 6:49  T:  2023 15:11  JOB #:  [id number]

## 2023-04-04 ENCOUNTER — TELEPHONE (OUTPATIENT)
Dept: SURGERY | Age: 58
End: 2023-04-04

## 2023-04-16 PROBLEM — I49.3 PVC (PREMATURE VENTRICULAR CONTRACTION): Status: ACTIVE | Noted: 2023-04-16

## 2023-04-16 PROBLEM — G47.33 OSA (OBSTRUCTIVE SLEEP APNEA): Status: ACTIVE | Noted: 2023-04-16

## 2023-04-16 PROBLEM — R00.2 PALPITATION: Status: ACTIVE | Noted: 2023-04-16

## 2023-04-16 PROBLEM — I10 HTN (HYPERTENSION), BENIGN: Status: ACTIVE | Noted: 2023-04-16

## 2023-04-20 ENCOUNTER — OFFICE VISIT (OUTPATIENT)
Dept: CARDIOLOGY CLINIC | Age: 58
End: 2023-04-20

## 2023-04-20 VITALS
HEIGHT: 67 IN | SYSTOLIC BLOOD PRESSURE: 118 MMHG | HEART RATE: 72 BPM | WEIGHT: 270 LBS | BODY MASS INDEX: 42.38 KG/M2 | DIASTOLIC BLOOD PRESSURE: 76 MMHG | OXYGEN SATURATION: 98 %

## 2023-04-20 DIAGNOSIS — G47.33 OSA (OBSTRUCTIVE SLEEP APNEA): ICD-10-CM

## 2023-04-20 DIAGNOSIS — I10 HTN (HYPERTENSION), BENIGN: ICD-10-CM

## 2023-04-20 DIAGNOSIS — Z09 HOSPITAL DISCHARGE FOLLOW-UP: ICD-10-CM

## 2023-04-20 DIAGNOSIS — I49.3 PVC (PREMATURE VENTRICULAR CONTRACTION): Primary | ICD-10-CM

## 2023-04-20 DIAGNOSIS — R00.2 PALPITATION: ICD-10-CM

## 2023-04-20 RX ORDER — METOPROLOL SUCCINATE 25 MG/1
25 TABLET, EXTENDED RELEASE ORAL DAILY
Qty: 90 TABLET | Refills: 3 | Status: SHIPPED | OUTPATIENT
Start: 2023-04-20

## 2023-04-20 NOTE — PROGRESS NOTES
Per Bigg Villaseñor NP- return to see NP in 2 weeks for BP check then follow up 3 months with Dr. Matthew Minaya.

## 2023-04-20 NOTE — PROGRESS NOTES
Chief Complaint   Patient presents with    [address] follow up-bigeminy and trigemini; heat monitor 3/28      Vitals:    04/20/23 0901   BP: 118/76   BP 1 Location: Right upper arm   BP Patient Position: Sitting   BP Cuff Size: Large adult   Pulse: 72   Height: 5' 7\" (1.702 m)   Weight: 270 lb (122.5 kg)   SpO2: 98%     Chest pain denied   SOB some was  told this is normal due to surgery   Palpitations denied   Swelling in hands/feet denied   Dizziness denied   Recent hospital stays denied   Refills denied

## 2023-05-04 ENCOUNTER — OFFICE VISIT (OUTPATIENT)
Dept: CARDIOLOGY CLINIC | Age: 58
End: 2023-05-04

## 2023-05-04 ENCOUNTER — CLINICAL SUPPORT (OUTPATIENT)
Dept: CARDIOLOGY CLINIC | Age: 58
End: 2023-05-04

## 2023-05-04 VITALS
HEART RATE: 67 BPM | HEIGHT: 67 IN | WEIGHT: 275.2 LBS | DIASTOLIC BLOOD PRESSURE: 78 MMHG | OXYGEN SATURATION: 97 % | BODY MASS INDEX: 43.19 KG/M2 | SYSTOLIC BLOOD PRESSURE: 130 MMHG

## 2023-05-04 DIAGNOSIS — G47.33 OSA (OBSTRUCTIVE SLEEP APNEA): ICD-10-CM

## 2023-05-04 DIAGNOSIS — I49.3 PVC (PREMATURE VENTRICULAR CONTRACTION): Primary | ICD-10-CM

## 2023-05-04 DIAGNOSIS — I10 HTN (HYPERTENSION), BENIGN: ICD-10-CM

## 2023-05-16 ENCOUNTER — TELEPHONE (OUTPATIENT)
Age: 58
End: 2023-05-16

## 2023-06-26 ENCOUNTER — OFFICE VISIT (OUTPATIENT)
Age: 58
End: 2023-06-26
Payer: COMMERCIAL

## 2023-06-26 VITALS
DIASTOLIC BLOOD PRESSURE: 98 MMHG | BODY MASS INDEX: 42.69 KG/M2 | HEIGHT: 67 IN | HEART RATE: 50 BPM | SYSTOLIC BLOOD PRESSURE: 142 MMHG | WEIGHT: 272 LBS | RESPIRATION RATE: 18 BRPM | OXYGEN SATURATION: 98 %

## 2023-06-26 DIAGNOSIS — G47.33 OSA (OBSTRUCTIVE SLEEP APNEA): ICD-10-CM

## 2023-06-26 DIAGNOSIS — I49.3 PVC (PREMATURE VENTRICULAR CONTRACTION): Primary | ICD-10-CM

## 2023-06-26 DIAGNOSIS — E66.01 OBESITY, MORBID (HCC): ICD-10-CM

## 2023-06-26 DIAGNOSIS — I10 HTN (HYPERTENSION), BENIGN: ICD-10-CM

## 2023-06-26 PROCEDURE — 99204 OFFICE O/P NEW MOD 45 MIN: CPT | Performed by: INTERNAL MEDICINE

## 2023-06-26 PROCEDURE — 93000 ELECTROCARDIOGRAM COMPLETE: CPT | Performed by: INTERNAL MEDICINE

## 2023-06-26 PROCEDURE — 3080F DIAST BP >= 90 MM HG: CPT | Performed by: INTERNAL MEDICINE

## 2023-06-26 PROCEDURE — 3077F SYST BP >= 140 MM HG: CPT | Performed by: INTERNAL MEDICINE

## 2023-06-26 RX ORDER — METOPROLOL SUCCINATE 25 MG/1
25 TABLET, EXTENDED RELEASE ORAL DAILY
COMMUNITY

## 2023-06-26 RX ORDER — DOCUSATE SODIUM 100 MG/1
100 CAPSULE, LIQUID FILLED ORAL PRN
COMMUNITY
Start: 2023-03-31

## 2023-06-26 RX ORDER — AMLODIPINE BESYLATE 2.5 MG/1
TABLET ORAL
COMMUNITY
Start: 2023-05-19 | End: 2023-06-26

## 2023-06-26 RX ORDER — IBUPROFEN 200 MG
200 TABLET ORAL AS NEEDED
COMMUNITY

## 2023-06-26 ASSESSMENT — PATIENT HEALTH QUESTIONNAIRE - PHQ9
1. LITTLE INTEREST OR PLEASURE IN DOING THINGS: 0
SUM OF ALL RESPONSES TO PHQ QUESTIONS 1-9: 0
SUM OF ALL RESPONSES TO PHQ QUESTIONS 1-9: 0
SUM OF ALL RESPONSES TO PHQ9 QUESTIONS 1 & 2: 0
2. FEELING DOWN, DEPRESSED OR HOPELESS: 0
SUM OF ALL RESPONSES TO PHQ QUESTIONS 1-9: 0
SUM OF ALL RESPONSES TO PHQ QUESTIONS 1-9: 0

## 2023-07-20 ENCOUNTER — OFFICE VISIT (OUTPATIENT)
Age: 58
End: 2023-07-20
Payer: COMMERCIAL

## 2023-07-20 VITALS
WEIGHT: 271 LBS | BODY MASS INDEX: 42.53 KG/M2 | HEIGHT: 67 IN | SYSTOLIC BLOOD PRESSURE: 115 MMHG | DIASTOLIC BLOOD PRESSURE: 70 MMHG | HEART RATE: 60 BPM

## 2023-07-20 DIAGNOSIS — E66.01 OBESITY, MORBID (HCC): ICD-10-CM

## 2023-07-20 DIAGNOSIS — I10 ESSENTIAL (PRIMARY) HYPERTENSION: ICD-10-CM

## 2023-07-20 DIAGNOSIS — G47.33 OBSTRUCTIVE SLEEP APNEA (ADULT) (PEDIATRIC): ICD-10-CM

## 2023-07-20 DIAGNOSIS — I49.3 PVC (PREMATURE VENTRICULAR CONTRACTION): Primary | ICD-10-CM

## 2023-07-20 PROCEDURE — 99214 OFFICE O/P EST MOD 30 MIN: CPT | Performed by: SPECIALIST

## 2023-07-20 PROCEDURE — 3074F SYST BP LT 130 MM HG: CPT | Performed by: SPECIALIST

## 2023-07-20 PROCEDURE — 3078F DIAST BP <80 MM HG: CPT | Performed by: SPECIALIST

## 2023-07-20 NOTE — PROGRESS NOTES
CARDIOLOGY OFFICE NOTE    Siddhartha Reynolds MD, [facility] [address], [place]  Phone [phone]; Fax [phone]  Mobile [phone]   Voice Mail [phone]    Primary care: Mary Jane Munoz MD       ATTENTION:   This medical record was transcribed using an electronic medical records/speech recognition system. Although proofread, it may and can contain electronic, spelling and other errors. Corrections may be executed at a later time. Please feel free to contact us for any clarifications as needed. ICD-10-CM  ICD-9-CM             1.  PVC (premature ventricular contraction)   I49.3  427.69  AMB POC EKG ROUTINE W/ 12 LEADS, INTER & REP                  2.  HTN (hypertension), benign   I10  401.1                    3.  LENI (obstructive sleep apnea)   G47.33  327.23                    4. BMI 40.0-44.9, adult [facility])   Z68.41  V85.41                          HISTORY OF PRESENT ILLNESS:          Othelia Phalen is a 60 y.o. female with  referred for PVCs and is followed by EP          Cardiac risk factors: hypertension and obesity (BMI >= 30 kg/m2)  I have personally obtained the history from the patient. HISTORY OF PRESENTING ILLNESS    Ms./Mr. Othelia Phalen  60 y.o. is is being seen today for history of PVCs she had trigeminy and bigeminy. She had breast cancer surgery and since that time had some irregularity in rhythm with palpitations. She had 11-day Holter that showed PVC burden of 16%. She has a history of LENI hypertension    PMH significant for breast CA, HTN, LENI on CPAP and GERD. She did see Dr. Unique Haywood for a PVC burden of 16% on the monitor. She was started on metoprolol and the repeat event monitor revealed a reduction to 9% in her PVC burden. EF is normal.  It was his feeling that as long as her PVC burden remains low she is at a decreased risk of cardiomyopathy however if it goes back up consider ablation.        ACTIVE PROBLEM LIST

## 2023-07-20 NOTE — PATIENT INSTRUCTIONS

## 2023-07-21 ENCOUNTER — TELEPHONE (OUTPATIENT)
Age: 58
End: 2023-07-21

## 2023-07-21 NOTE — TELEPHONE ENCOUNTER
Pt is calling because she was suppose to have a prescription called into the pharmacy but when she called they said they didn't have it. Pt needs the weygovi called into her pharmacy. Pharmacy confirmed. [facility]   [address]  [phone] patient

## 2023-08-01 ENCOUNTER — TELEPHONE (OUTPATIENT)
Age: 58
End: 2023-08-01

## 2023-08-01 NOTE — TELEPHONE ENCOUNTER
Pt called and would like to speak to nurse, stated she need clearance for surgery 8/10,please advise    [phone]

## 2023-08-04 ENCOUNTER — TELEPHONE (OUTPATIENT)
Age: 58
End: 2023-08-04

## 2023-08-04 NOTE — TELEPHONE ENCOUNTER
Pt is calling because she needs to have surgery on 8/10/23. Jose Merchant is the plastic surgeon patient had a metastatic done. Pt is requesting cardiac clearance. Pt said she is schedule for Nuclear Stress  test also on 8/21/23.      Please assist    [phone] office nurse [phone] [facility] Surgeon Jose Merchant    [phone]

## 2023-08-04 NOTE — TELEPHONE ENCOUNTER
Please look to see if any openings for 2 day stress test is available.  Surgeon office aware that clearance unable to be given till stress test.

## 2023-08-08 NOTE — TELEPHONE ENCOUNTER
Jerral Cramp, LPN  Caller: Unspecified (4 days ago, 12:26 PM)  S/w pt - she would like to leave the appts as they stand - surgery will be pushed back a little but it's not urgent per pt

## 2023-08-29 ENCOUNTER — TELEPHONE (OUTPATIENT)
Age: 58
End: 2023-08-29

## 2023-08-29 NOTE — TELEPHONE ENCOUNTER
Lazara kilpatrick/[facility] called for a complete report for the stress test last week. It needs a conclusion/signature. Office notes and cardiac clearance is needed.            Fax # [phone]

## 2023-12-14 ENCOUNTER — TELEPHONE (OUTPATIENT)
Age: 58
End: 2023-12-14

## 2024-04-22 RX ORDER — METOPROLOL SUCCINATE 25 MG/1
25 TABLET, EXTENDED RELEASE ORAL DAILY
Qty: 90 TABLET | Refills: 0 | Status: SHIPPED | OUTPATIENT
Start: 2024-04-22

## 2024-05-21 RX ORDER — METOPROLOL SUCCINATE 25 MG/1
25 TABLET, EXTENDED RELEASE ORAL DAILY
Qty: 90 TABLET | Refills: 0 | Status: SHIPPED | OUTPATIENT
Start: 2024-05-21

## 2024-05-29 ENCOUNTER — TELEPHONE (OUTPATIENT)
Age: 59
End: 2024-05-29

## 2024-05-29 NOTE — TELEPHONE ENCOUNTER
MARYLOUM for Pt. To call and Reschedule her appt. With BELaine Torres that was on 07/02.     Please Reschedule appt. For Pt. Yearly visit    Thank you!

## 2024-06-26 ENCOUNTER — OFFICE VISIT (OUTPATIENT)
Age: 59
End: 2024-06-26
Payer: COMMERCIAL

## 2024-06-26 VITALS
SYSTOLIC BLOOD PRESSURE: 120 MMHG | HEIGHT: 67 IN | BODY MASS INDEX: 42.38 KG/M2 | OXYGEN SATURATION: 99 % | DIASTOLIC BLOOD PRESSURE: 70 MMHG | WEIGHT: 270 LBS | HEART RATE: 81 BPM

## 2024-06-26 DIAGNOSIS — Z13.220 SCREENING CHOLESTEROL LEVEL: Primary | ICD-10-CM

## 2024-06-26 DIAGNOSIS — E66.01 OBESITY, MORBID (HCC): ICD-10-CM

## 2024-06-26 DIAGNOSIS — I10 ESSENTIAL (PRIMARY) HYPERTENSION: ICD-10-CM

## 2024-06-26 DIAGNOSIS — G47.33 OBSTRUCTIVE SLEEP APNEA (ADULT) (PEDIATRIC): ICD-10-CM

## 2024-06-26 DIAGNOSIS — G47.33 OBSTRUCTIVE SLEEP APNEA (ADULT) (PEDIATRIC): Primary | ICD-10-CM

## 2024-06-26 DIAGNOSIS — R00.2 PALPITATIONS: ICD-10-CM

## 2024-06-26 DIAGNOSIS — I10 HTN (HYPERTENSION), BENIGN: ICD-10-CM

## 2024-06-26 DIAGNOSIS — I49.3 PVC (PREMATURE VENTRICULAR CONTRACTION): ICD-10-CM

## 2024-06-26 DIAGNOSIS — G47.33 OSA (OBSTRUCTIVE SLEEP APNEA): ICD-10-CM

## 2024-06-26 PROCEDURE — 3078F DIAST BP <80 MM HG: CPT | Performed by: SPECIALIST

## 2024-06-26 PROCEDURE — 3074F SYST BP LT 130 MM HG: CPT | Performed by: SPECIALIST

## 2024-06-26 PROCEDURE — 99214 OFFICE O/P EST MOD 30 MIN: CPT | Performed by: SPECIALIST

## 2024-06-26 RX ORDER — ROSUVASTATIN CALCIUM 10 MG/1
10 TABLET, COATED ORAL
COMMUNITY
End: 2024-06-26 | Stop reason: SDUPTHER

## 2024-06-26 RX ORDER — ROSUVASTATIN CALCIUM 10 MG/1
10 TABLET, COATED ORAL
Qty: 15 TABLET | Refills: 3 | Status: SHIPPED | OUTPATIENT
Start: 2024-06-26

## 2024-06-26 NOTE — PROGRESS NOTES
CARDIOLOGY OFFICE NOTE    Siddhartha Mccall MD, [facility]    [address], [place] [address]  Phone [phone]; Fax [phone]  Mobile [phone]   Voice Mail [phone]    Primary care: Vineet Denton MD       ATTENTION:   This medical record was transcribed using an electronic medical records/speech recognition system.  Although proofread, it may and can contain electronic, spelling and other errors.  Corrections may be executed at a later time.  Please feel free to contact us for any clarifications as needed.                    ICD-10-CM  ICD-9-CM             1.  PVC (premature ventricular contraction)   I49.3  427.69  AMB POC EKG ROUTINE W/ 12 LEADS, INTER & REP                  2.  HTN (hypertension), benign   I10  401.1                    3.  LENI (obstructive sleep apnea)   G47.33  327.23                    4.  BMI 40.0-44.9, adult (HCC)   Z68.41  V85.41                          HISTORY OF PRESENT ILLNESS:          Rebecca Christian is a 58 y.o. female with  referred for PVCs and is followed by EP          Cardiac risk factors: hypertension and obesity (BMI >= 30 kg/m2)  I have personally obtained the history from the patient.    HISTORY OF PRESENTING ILLNESS    Ms./Mr. Rebecca Christian  58 y.o. is is being seen today for history of PVCs she had trigeminy and bigeminy.  She had breast cancer surgery and since that time had some irregularity in rhythm with palpitations.  She had 11-day Holter that showed PVC burden of 16%.  She also has a history of LENI and hypertension   In the past she saw Dr. Rivers was noted to have a PVC burden of 16% on the cardiac monitor.  She was started metoprolol and repeat monitor revealed reduction in PVCs 9%.  In the past I wanted to get her cholesterol checked and had not been done or LDL in 2022 was 160.  She needs repeat cholesterol profile.       ACTIVE PROBLEM LIST     Patient Active Problem List    Diagnosis Date Noted    PVC (premature

## 2024-06-26 NOTE — PROGRESS NOTES
Chief Complaint   Patient presents with    Hypertension    Sleep Apnea     Vitals:    24 1354   BP: 120/70   Site: Left Upper Arm   Position: Sitting   Pulse: 81   SpO2: 99%   Weight: 122.5 kg (270 lb)   Height: 1.702 m (5' 7\")     Chest pain: DENIED     Recent hospital stays: DENIED     Refills: DENIED     NAME Rebecca TOMPKINS    1965      MRN    [id number]      LAST OFFICE APPOINTMENT: 2023     DIAGNOSIS  No diagnosis found.    HOME MEDICATION  Current Outpatient Medications   Medication Sig    Semaglutide, 1 MG/DOSE, (OZEMPIC) 4 MG/3ML SOPN sc injection Inject 1 mg into the skin every 7 days    metoprolol succinate (TOPROL XL) 25 MG extended release tablet Take 1 tablet by mouth daily Must come to 24 appointment for refills    diclofenac sodium (VOLTAREN) 1 % GEL Apply 1 application topically as needed    docusate sodium (COLACE) 100 MG capsule Take 1 capsule by mouth as needed    Cetirizine HCl 10 MG CAPS Take by mouth as needed    meloxicam (MOBIC) 15 MG tablet Take by mouth as needed    Semaglutide-Weight Management (WEGOVY) 0.25 MG/0.5ML SOAJ SC injection Inject 0.25 mg into the skin every 7 days (Patient not taking: Reported on 2024)    ibuprofen (ADVIL;MOTRIN) 200 MG tablet Take 1 tablet by mouth as needed for Pain Low dose as needded     No current facility-administered medications for this visit.       VITAL SIGNS  Wt Readings from Last 3 Encounters:   24 122.5 kg (270 lb)   23 122.9 kg (271 lb)   23 122.9 kg (271 lb)     BP Readings from Last 3 Encounters:   24 120/70   23 124/76   23 115/70     Pulse Readings from Last 3 Encounters:   24 81   23 74   23 60         SPECIALTY COMMENTS  1. Echo  3/28/23- EF 55-60%    2. Loop  3/31-23- SR with PVC's, average HR 80, min 50, max 120, PVC burden 16%  ---SB with PVC's, average HR 54, min 40, max 85, PVC burden 9.11%    3. Stress Test  23-Lexiscan-normal, EF

## 2024-08-21 ENCOUNTER — TELEPHONE (OUTPATIENT)
Age: 59
End: 2024-08-21

## 2024-08-21 RX ORDER — METOPROLOL SUCCINATE 25 MG/1
25 TABLET, EXTENDED RELEASE ORAL DAILY
Qty: 90 TABLET | Refills: 3 | Status: SHIPPED | OUTPATIENT
Start: 2024-08-21

## 2024-08-21 NOTE — TELEPHONE ENCOUNTER
Patient is calling because she needs a refill on her metoprolol succinate 25 mg.Pharmacy is confirmed.    [phone] patient

## 2024-08-21 NOTE — TELEPHONE ENCOUNTER
Refill per VO of Dr Cuevas  Last appt: 06/20/24    Future Appointments   Date Time Provider Department Center   12/13/2024 11:00 AM Rufina Cuevas MD [facility]       Requested Prescriptions     Signed Prescriptions Disp Refills    metoprolol succinate (TOPROL XL) 25 MG extended release tablet 90 tablet 3     Sig: Take 1 tablet by mouth daily Must come to 6/20/24 appointment for refills     Authorizing Provider: RUFINA CUEVAS     Ordering User: SHIRIN RAMÍREZ

## 2025-05-23 RX ORDER — SEMAGLUTIDE 0.25 MG/.5ML
INJECTION, SOLUTION SUBCUTANEOUS
Refills: 4 | OUTPATIENT
Start: 2025-05-23

## 2025-05-23 NOTE — TELEPHONE ENCOUNTER
Refill per VO of Dr. Cuevas  Last appt: 6/26/2024    Future Appointments   Date Time Provider Department Center   7/25/2025  8:20 AM Heidi Rivers MD [facility]       Requested Prescriptions     Refused Prescriptions Disp Refills    WEGOVY 0.25 MG/0.5ML SOAJ SC injection [Pharmacy Med Name: WEGOVY 0.25 MG/0.5 ML PEN]  4     Sig: INJECT 0.5ML UNDER THE SKIN EVERY 7 DAYS     Refused By: ATA DAVID     Reason for Refusal: Patient no longer under prescriber care

## 2025-06-20 RX ORDER — METOPROLOL SUCCINATE 25 MG/1
25 TABLET, EXTENDED RELEASE ORAL DAILY
Qty: 35 TABLET | Refills: 0 | Status: SHIPPED | OUTPATIENT
Start: 2025-06-20

## 2025-07-16 RX ORDER — METOPROLOL SUCCINATE 25 MG/1
25 TABLET, EXTENDED RELEASE ORAL DAILY
Qty: 90 TABLET | Refills: 1 | OUTPATIENT
Start: 2025-07-16

## 2025-07-16 NOTE — TELEPHONE ENCOUNTER
Ordered Per  Verbal Order.   Has follow up 07/25/2025  Last appt: 6/26/2024   Future Appointments   Date Time Provider Department Center   7/25/2025  8:20 AM Heidi Rivers MD [facility]       Requested Prescriptions     Pending Prescriptions Disp Refills    metoprolol succinate (TOPROL XL) 25 MG extended release tablet [Pharmacy Med Name: METOPROLOL SUCC ER 25 MG TAB] 90 tablet 1     Sig: TAKE 1 TABLET BY MOUTH DAILY MORE REFILLS AT 7/25/25 APPOINTMENT         Prior labs and Blood pressures:  BP Readings from Last 3 Encounters:   06/26/24 120/70   08/21/23 124/76   07/20/23 115/70     Lab Results   Component Value Date/Time     03/29/2023 12:57 PM    K 3.6 03/29/2023 12:57 PM     03/29/2023 12:57 PM    CO2 25 03/29/2023 12:57 PM    BUN 10 03/29/2023 12:57 PM

## 2025-07-21 RX ORDER — METOPROLOL SUCCINATE 25 MG/1
25 TABLET, EXTENDED RELEASE ORAL DAILY
Qty: 30 TABLET | Refills: 0 | Status: SHIPPED | OUTPATIENT
Start: 2025-07-21

## 2025-07-21 NOTE — TELEPHONE ENCOUNTER
Ordered Per  Verbal Order.   Has f/u scheduled for 07/25/2025  Last appt: 06/26/2023  Future Appointments   Date Time Provider Department Center   7/25/2025  8:20 AM Heidi Rivers MD [facility]       Requested Prescriptions     Pending Prescriptions Disp Refills    metoprolol succinate (TOPROL XL) 25 MG extended release tablet [Pharmacy Med Name: METOPROLOL SUCC ER 25 MG TAB] 30 tablet 1     Sig: TAKE 1 TABLET BY MOUTH DAILY MORE REFILLS AT 7/25/25 APPOINTMENT         Prior labs and Blood pressures:  BP Readings from Last 3 Encounters:   06/26/24 120/70   08/21/23 124/76   07/20/23 115/70     Lab Results   Component Value Date/Time     03/29/2023 12:57 PM    K 3.6 03/29/2023 12:57 PM     03/29/2023 12:57 PM    CO2 25 03/29/2023 12:57 PM    BUN 10 03/29/2023 12:57 PM

## 2025-07-25 ENCOUNTER — OFFICE VISIT (OUTPATIENT)
Age: 60
End: 2025-07-25
Payer: COMMERCIAL

## 2025-07-25 VITALS
HEART RATE: 74 BPM | BODY MASS INDEX: 45.99 KG/M2 | WEIGHT: 293 LBS | OXYGEN SATURATION: 97 % | HEIGHT: 67 IN | SYSTOLIC BLOOD PRESSURE: 126 MMHG | DIASTOLIC BLOOD PRESSURE: 84 MMHG

## 2025-07-25 DIAGNOSIS — I10 HTN (HYPERTENSION), BENIGN: ICD-10-CM

## 2025-07-25 DIAGNOSIS — G47.33 OSA (OBSTRUCTIVE SLEEP APNEA): ICD-10-CM

## 2025-07-25 DIAGNOSIS — E66.01 OBESITY, MORBID (HCC): ICD-10-CM

## 2025-07-25 DIAGNOSIS — I49.3 PVC (PREMATURE VENTRICULAR CONTRACTION): Primary | ICD-10-CM

## 2025-07-25 PROCEDURE — 3074F SYST BP LT 130 MM HG: CPT | Performed by: INTERNAL MEDICINE

## 2025-07-25 PROCEDURE — 3079F DIAST BP 80-89 MM HG: CPT | Performed by: INTERNAL MEDICINE

## 2025-07-25 PROCEDURE — 93000 ELECTROCARDIOGRAM COMPLETE: CPT | Performed by: INTERNAL MEDICINE

## 2025-07-25 PROCEDURE — 99214 OFFICE O/P EST MOD 30 MIN: CPT | Performed by: INTERNAL MEDICINE

## 2025-07-25 NOTE — PROGRESS NOTES
Cardiac Electrophysiology OFFICE Follow-up Note     Primary Cardiologist: Dr. Mccall    Assessment/Plan:   1. PVC (premature ventricular contraction)  -     EKG 12 Lead  2. HTN (hypertension), benign  -     EKG 12 Lead  3. LENI (obstructive sleep apnea)  -     EKG 12 Lead  4. Obesity, morbid (HCC)  -     EKG 12 Lead         Frequent PVCs  Found at the time of her abdominal surgery.  Prior burden of 16%.  She was started on metoprolol with repeat event monitor demonstrated burden down to 9%.  Tolerating medication well and denies any significant symptoms.  Echocardiogram demonstrated normal LV systolic function 55 to 60%.  - Spoke to the patient in great details regarding PVC mediated cardiomyopathy  - Explained that as long as her burden remains less than 10%, will continue conservative management  - If she is intolerant of medicine, have worsening PVC burden will develop cardiomyopathy, and we can consider EP study/PVC ablation  - Continue metoprolol 25 mg daily  - doing well from the EP perspective, no functional limitations, normal LVEF  - reassurance provided, FU with EP as needed  - will establish care with general cardiology at [facility]    Hypertension  Continue metoprolol for high blood pressure    Morbid obesity  I have discussed with the patient the need to exercise and lose weight. Encouraged increased physical activity as able and reduced caloric intake.  - on Wegovy   - weight stable    LENI  History of LENI on CPAP.  We spoke regarding the associated between LENI and its role in AFib pathophysiology. I emphasized the importance of LENI diagnosis and the need CPAP compliance.        Subjective:       Rebecca Christian is a 60 y.o. patient who is seen for evaluation of  PVCs.    Prior event monitor demonstrated PVCs burden of 16%--> repeat in 5/22 demonstrated burden of 9%. TTE demonstrated normal function with LVEF of 55-60%.  S/p deep flap with breast reconstruction. Currently on Metoprolol 25 mg daily.  Has

## 2025-07-25 NOTE — PATIENT INSTRUCTIONS
Referral to establish care with Dr. King at [facility] for general cardiology    FU with EP as needed

## 2025-07-25 NOTE — PROGRESS NOTES
Chief Complaint   Patient presents with    PVC    Sleep Apnea     Vitals:    07/25/25 0833   BP: 126/84   BP Site: Left Upper Arm   Patient Position: Sitting   Pulse: 74   SpO2: 97%   Weight: 133.4 kg (294 lb)   Height: 1.702 m (5' 7\")         Chest pain: DENIED     Recent hospital stays: DENIED     Refills: DENIED

## 2025-08-25 RX ORDER — METOPROLOL SUCCINATE 25 MG/1
25 TABLET, EXTENDED RELEASE ORAL DAILY
Qty: 90 TABLET | Refills: 1 | Status: SHIPPED | OUTPATIENT
Start: 2025-08-25

## (undated) DEVICE — DRAIN SURG 19FR 0.25IN SIL RND W/ TRCR INDIC DOT RADPQ FULL

## (undated) DEVICE — DECANTER BAG 9": Brand: MEDLINE INDUSTRIES, INC.

## (undated) DEVICE — SUTURE ETHLN SZ 9-0 L5IN NONABSORBABLE BLK BV130-5 L19MM 2809G

## (undated) DEVICE — TUBING, SUCTION, 1/4" X 10', STRAIGHT: Brand: MEDLINE

## (undated) DEVICE — Device

## (undated) DEVICE — COVER US PRB W15XL120CM W/ GEL RUBBERBAND TAPE STRP FLD GEN

## (undated) DEVICE — TTL1LYR 16FR10ML 100%SIL TMPST TR: Brand: MEDLINE

## (undated) DEVICE — BLANKET WRM W35.4XL86.6IN FULL UNDERBODY + FORC AIR

## (undated) DEVICE — 3M™ TEGADERM™ TRANSPARENT FILM DRESSING FRAME STYLE, 1626W, 4 IN X 4-3/4 IN (10 CM X 12 CM), 50/CT 4CT/CASE: Brand: 3M™ TEGADERM™

## (undated) DEVICE — SUTURE VCRL SZ 2-0 L36IN ABSRB UD L36MM CT-1 1/2 CIR J945H

## (undated) DEVICE — SUTURE NONABSORBABLE MONOFILAMENT 2-0 FS 18 IN ETHILON 664H

## (undated) DEVICE — ELECTRODE PT RET AD L9FT HI MOIST COND ADH HYDRGEL CORDED

## (undated) DEVICE — CANISTER, RIGID, 3000CC: Brand: MEDLINE INDUSTRIES, INC.

## (undated) DEVICE — BNDG SELF ADH 4INX5YD STRL LF -- COFLEX LF2

## (undated) DEVICE — MICROVASCULAR CLAMPS ARE USED FOR END-TO-END ANASTOMOTIC PROCEDURES FOR ARTERIES AND VEINS: Brand: GEM BIOVER MICROVASCULAR CLAMP

## (undated) DEVICE — SPONGE LAPAROTOMY W18XL18IN WHITE STRUNG RADIOPAQUE STERILE

## (undated) DEVICE — SUTURE ABSRB L30CM 2-0 VLT SPRL PDS + STRATAFIX SXPP1B410

## (undated) DEVICE — HYPODERMIC SAFETY NEEDLE: Brand: MAGELLAN

## (undated) DEVICE — RESERVOIR,SUCTION,100CC,SILICONE: Brand: MEDLINE

## (undated) DEVICE — GEL US 20GM NONIRRITATING OVERWRAPPED FILE PCH TRNSMIT

## (undated) DEVICE — SUT SLK 2-0SH 30IN BLK --

## (undated) DEVICE — STAPLER SKIN H3.9MM WIRE DIA0.58MM CRWN 6.9MM 35 STPL ROT

## (undated) DEVICE — DRAIN WND 19FR 1/4 FULL-FLUTED --

## (undated) DEVICE — SOLUTION IRRIG 1000ML 0.9% SOD CHL USP POUR PLAS BTL

## (undated) DEVICE — SUTURE MCRYL SZ 4-0 L27IN ABSRB UD L19MM PS-2 1/2 CIR PRIM Y426H

## (undated) DEVICE — CHEST PACK-SFMCASU: Brand: MEDLINE INDUSTRIES, INC.

## (undated) DEVICE — SUTURE VCRL SZ 3-0 L18IN ABSRB UD L26MM SH 1/2 CIR J864D

## (undated) DEVICE — PLASTICS -SFMC: Brand: MEDLINE INDUSTRIES, INC.

## (undated) DEVICE — BLADE ES ELASTOMERIC COAT INSUL DURABLE BEND UPTO 90DEG

## (undated) DEVICE — MATERIAL BKGRND W25XL50MM 1MM GRID LN BLU SIL RADPQ MERCIAN

## (undated) DEVICE — PLUMEPEN PRO SURGICAL SMOKE EVACUATION PENCIL, 7/8" (22 MM), 10 FT. (3 M): Brand: PLUMEPEN

## (undated) DEVICE — APPLIER LIG CLP M L11IN TI STR RNG HNDL FOR 20 CLP DISP

## (undated) DEVICE — FRAZIER SUCTION INSTRUMENT 7 FR W/CONTROL VENT & OBTURATOR: Brand: FRAZIER

## (undated) DEVICE — HOOKS ELASTIC STAY 12MM BLUNT -- PK/8

## (undated) DEVICE — COVER LT HNDL PLAS RIG 1 PER PK

## (undated) DEVICE — TUBING SUCT 10FR MAL ALUM SHFT FN CAP VENT UNIV CONN W/ OBT

## (undated) DEVICE — GLOVE ORANGE PI 7 1/2   MSG9075

## (undated) DEVICE — GAUZE SPNG XRAY 4X4IN 16PLY -- STRL

## (undated) DEVICE — PREP SKN CHLRAPRP APL 26ML STR --

## (undated) DEVICE — BLADE ASSEMB CLP HAIR FINE --

## (undated) DEVICE — TOWEL SURG W17XL27IN STD BLU COT NONFENESTRATED PREWASHED

## (undated) DEVICE — SENSOR OXMTR PTCH TISS DISP

## (undated) DEVICE — SUTURE MCRYL SZ 3-0 L27IN ABSRB UD L19MM PS-2 3/8 CIR PRIM Y427H

## (undated) DEVICE — CORD ELECSURG BPLR 12 FT DISP [810T818750] [ADLER INSTRUMENT CO]

## (undated) DEVICE — CODMAN® SURGICAL PATTIES 3/4" X 3/4" (1.91CM X 1.91CM): Brand: CODMAN®

## (undated) DEVICE — DRAPE MICSCP W46XL120IN FOR ZEISS MD FEATURING CLEARLENS

## (undated) DEVICE — SYR 10ML LUER LOK 1/5ML GRAD --

## (undated) DEVICE — STAPLER SKN FIX HD COUNT STRL -- SUB STAPLER35WA  6/CA $58.16

## (undated) DEVICE — WIPE 400300 MEROCEL 20PK INSTRUMENT: Brand: MEROCEL®

## (undated) DEVICE — DRAPE THERMABASIN INTRATEMP --

## (undated) DEVICE — SUTURE STRATAFIX SPRL SZ 3-0 L12IN ABSRB UD FS-1 L30X30CM SXMP2B410

## (undated) DEVICE — APPLIER CLP L9.375IN APER 2.1MM CLS L3.8MM 20 SM TI CLP

## (undated) DEVICE — SOL INJ SOD CL0.9% 10ML PREFIL -- SUB B-D306546Z 30/BX $6.00

## (undated) DEVICE — UNIVERSAL DRAPES: Brand: MEDLINE INDUSTRIES, INC.